# Patient Record
Sex: FEMALE | Race: OTHER | NOT HISPANIC OR LATINO | URBAN - METROPOLITAN AREA
[De-identification: names, ages, dates, MRNs, and addresses within clinical notes are randomized per-mention and may not be internally consistent; named-entity substitution may affect disease eponyms.]

---

## 2024-04-01 ENCOUNTER — INPATIENT (INPATIENT)
Facility: HOSPITAL | Age: 54
LOS: 1 days | Discharge: HOME | DRG: 272 | End: 2024-04-03
Attending: HOSPITALIST | Admitting: INTERNAL MEDICINE
Payer: COMMERCIAL

## 2024-04-01 ENCOUNTER — RESULT REVIEW (OUTPATIENT)
Age: 54
End: 2024-04-01

## 2024-04-01 VITALS
TEMPERATURE: 98 F | HEIGHT: 63 IN | WEIGHT: 130.29 LBS | OXYGEN SATURATION: 99 % | HEART RATE: 104 BPM | RESPIRATION RATE: 24 BRPM

## 2024-04-01 DIAGNOSIS — I21.3 ST ELEVATION (STEMI) MYOCARDIAL INFARCTION OF UNSPECIFIED SITE: ICD-10-CM

## 2024-04-01 LAB
A1C WITH ESTIMATED AVERAGE GLUCOSE RESULT: 6.6 % — HIGH (ref 4–5.6)
ALBUMIN SERPL ELPH-MCNC: 4.6 G/DL — SIGNIFICANT CHANGE UP (ref 3.3–5)
ALP SERPL-CCNC: 90 U/L — SIGNIFICANT CHANGE UP (ref 40–120)
ALT FLD-CCNC: 24 U/L — SIGNIFICANT CHANGE UP (ref 10–45)
ANION GAP SERPL CALC-SCNC: 15 MMOL/L — SIGNIFICANT CHANGE UP (ref 5–17)
APTT BLD: 35.1 SEC — SIGNIFICANT CHANGE UP (ref 24.5–35.6)
APTT BLD: 55.1 SEC — HIGH (ref 24.5–35.6)
APTT BLD: >200 SEC — CRITICAL HIGH (ref 24.5–35.6)
AST SERPL-CCNC: 37 U/L — SIGNIFICANT CHANGE UP (ref 10–40)
BILIRUB SERPL-MCNC: 0.5 MG/DL — SIGNIFICANT CHANGE UP (ref 0.2–1.2)
BLD GP AB SCN SERPL QL: NEGATIVE — SIGNIFICANT CHANGE UP
BUN SERPL-MCNC: 14 MG/DL — SIGNIFICANT CHANGE UP (ref 7–23)
CALCIUM SERPL-MCNC: 9 MG/DL — SIGNIFICANT CHANGE UP (ref 8.4–10.5)
CHLORIDE SERPL-SCNC: 103 MMOL/L — SIGNIFICANT CHANGE UP (ref 96–108)
CHOLEST SERPL-MCNC: 245 MG/DL — HIGH
CK MB BLD-MCNC: 7.7 % — HIGH (ref 0–3.5)
CK MB CFR SERPL CALC: 26.9 NG/ML — HIGH (ref 0–3.8)
CK SERPL-CCNC: 351 U/L — HIGH (ref 25–170)
CO2 SERPL-SCNC: 21 MMOL/L — LOW (ref 22–31)
CREAT SERPL-MCNC: 0.63 MG/DL — SIGNIFICANT CHANGE UP (ref 0.5–1.3)
EGFR: 107 ML/MIN/1.73M2 — SIGNIFICANT CHANGE UP
ESTIMATED AVERAGE GLUCOSE: 143 MG/DL — HIGH (ref 68–114)
GLUCOSE BLDC GLUCOMTR-MCNC: 124 MG/DL — HIGH (ref 70–99)
GLUCOSE BLDC GLUCOMTR-MCNC: 131 MG/DL — HIGH (ref 70–99)
GLUCOSE BLDC GLUCOMTR-MCNC: 141 MG/DL — HIGH (ref 70–99)
GLUCOSE SERPL-MCNC: 178 MG/DL — HIGH (ref 70–99)
HCG SERPL-ACNC: <2 MIU/ML — SIGNIFICANT CHANGE UP
HCT VFR BLD CALC: 38.5 % — SIGNIFICANT CHANGE UP (ref 34.5–45)
HDLC SERPL-MCNC: 68 MG/DL — SIGNIFICANT CHANGE UP
HGB BLD-MCNC: 12.8 G/DL — SIGNIFICANT CHANGE UP (ref 11.5–15.5)
INR BLD: 1.09 RATIO — SIGNIFICANT CHANGE UP (ref 0.85–1.18)
LIPID PNL WITH DIRECT LDL SERPL: 171 MG/DL — HIGH
MAGNESIUM SERPL-MCNC: 2 MG/DL — SIGNIFICANT CHANGE UP (ref 1.6–2.6)
MCHC RBC-ENTMCNC: 29.2 PG — SIGNIFICANT CHANGE UP (ref 27–34)
MCHC RBC-ENTMCNC: 33.2 GM/DL — SIGNIFICANT CHANGE UP (ref 32–36)
MCV RBC AUTO: 87.7 FL — SIGNIFICANT CHANGE UP (ref 80–100)
NON HDL CHOLESTEROL: 176 MG/DL — HIGH
NRBC # BLD: 0 /100 WBCS — SIGNIFICANT CHANGE UP (ref 0–0)
PHOSPHATE SERPL-MCNC: 2.6 MG/DL — SIGNIFICANT CHANGE UP (ref 2.5–4.5)
PLATELET # BLD AUTO: 260 K/UL — SIGNIFICANT CHANGE UP (ref 150–400)
POTASSIUM SERPL-MCNC: 4.2 MMOL/L — SIGNIFICANT CHANGE UP (ref 3.5–5.3)
POTASSIUM SERPL-SCNC: 4.2 MMOL/L — SIGNIFICANT CHANGE UP (ref 3.5–5.3)
PROT SERPL-MCNC: 8 G/DL — SIGNIFICANT CHANGE UP (ref 6–8.3)
PROTHROM AB SERPL-ACNC: 12 SEC — SIGNIFICANT CHANGE UP (ref 9.5–13)
RBC # BLD: 4.39 M/UL — SIGNIFICANT CHANGE UP (ref 3.8–5.2)
RBC # FLD: 12.3 % — SIGNIFICANT CHANGE UP (ref 10.3–14.5)
RH IG SCN BLD-IMP: POSITIVE — SIGNIFICANT CHANGE UP
SODIUM SERPL-SCNC: 139 MMOL/L — SIGNIFICANT CHANGE UP (ref 135–145)
TRIGL SERPL-MCNC: 40 MG/DL — SIGNIFICANT CHANGE UP
TROPONIN T, HIGH SENSITIVITY RESULT: 405 NG/L — HIGH (ref 0–51)
TSH SERPL-MCNC: 1.74 UIU/ML — SIGNIFICANT CHANGE UP (ref 0.27–4.2)
WBC # BLD: 11.73 K/UL — HIGH (ref 3.8–10.5)
WBC # FLD AUTO: 11.73 K/UL — HIGH (ref 3.8–10.5)

## 2024-04-01 PROCEDURE — 71045 X-RAY EXAM CHEST 1 VIEW: CPT | Mod: 26

## 2024-04-01 PROCEDURE — 33967 INSERT I-AORT PERCUT DEVICE: CPT

## 2024-04-01 PROCEDURE — 92941 PRQ TRLML REVSC TOT OCCL AMI: CPT | Mod: RC

## 2024-04-01 PROCEDURE — 93010 ELECTROCARDIOGRAM REPORT: CPT

## 2024-04-01 PROCEDURE — 93356 MYOCRD STRAIN IMG SPCKL TRCK: CPT

## 2024-04-01 PROCEDURE — 99292 CRITICAL CARE ADDL 30 MIN: CPT

## 2024-04-01 PROCEDURE — 93306 TTE W/DOPPLER COMPLETE: CPT | Mod: 26

## 2024-04-01 PROCEDURE — 93458 L HRT ARTERY/VENTRICLE ANGIO: CPT | Mod: 26,59

## 2024-04-01 RX ORDER — HYDRALAZINE HCL 50 MG
25 TABLET ORAL EVERY 8 HOURS
Refills: 0 | Status: DISCONTINUED | OUTPATIENT
Start: 2024-04-01 | End: 2024-04-02

## 2024-04-01 RX ORDER — HYDRALAZINE HCL 50 MG
10 TABLET ORAL EVERY 8 HOURS
Refills: 0 | Status: DISCONTINUED | OUTPATIENT
Start: 2024-04-01 | End: 2024-04-01

## 2024-04-01 RX ORDER — HEPARIN SODIUM 5000 [USP'U]/ML
700 INJECTION INTRAVENOUS; SUBCUTANEOUS
Qty: 25000 | Refills: 0 | Status: DISCONTINUED | OUTPATIENT
Start: 2024-04-01 | End: 2024-04-02

## 2024-04-01 RX ORDER — INSULIN LISPRO 100/ML
VIAL (ML) SUBCUTANEOUS AT BEDTIME
Refills: 0 | Status: DISCONTINUED | OUTPATIENT
Start: 2024-04-01 | End: 2024-04-03

## 2024-04-01 RX ORDER — INFLUENZA VIRUS VACCINE 15; 15; 15; 15 UG/.5ML; UG/.5ML; UG/.5ML; UG/.5ML
0.5 SUSPENSION INTRAMUSCULAR ONCE
Refills: 0 | Status: DISCONTINUED | OUTPATIENT
Start: 2024-04-01 | End: 2024-04-03

## 2024-04-01 RX ORDER — INSULIN LISPRO 100/ML
VIAL (ML) SUBCUTANEOUS
Refills: 0 | Status: DISCONTINUED | OUTPATIENT
Start: 2024-04-01 | End: 2024-04-03

## 2024-04-01 RX ORDER — FUROSEMIDE 40 MG
20 TABLET ORAL ONCE
Refills: 0 | Status: COMPLETED | OUTPATIENT
Start: 2024-04-01 | End: 2024-04-01

## 2024-04-01 RX ORDER — ATORVASTATIN CALCIUM 80 MG/1
80 TABLET, FILM COATED ORAL AT BEDTIME
Refills: 0 | Status: DISCONTINUED | OUTPATIENT
Start: 2024-04-01 | End: 2024-04-03

## 2024-04-01 RX ORDER — CLOPIDOGREL BISULFATE 75 MG/1
75 TABLET, FILM COATED ORAL DAILY
Refills: 0 | Status: DISCONTINUED | OUTPATIENT
Start: 2024-04-02 | End: 2024-04-03

## 2024-04-01 RX ORDER — ASPIRIN/CALCIUM CARB/MAGNESIUM 324 MG
81 TABLET ORAL DAILY
Refills: 0 | Status: DISCONTINUED | OUTPATIENT
Start: 2024-04-02 | End: 2024-04-03

## 2024-04-01 RX ORDER — METOPROLOL TARTRATE 50 MG
12.5 TABLET ORAL
Refills: 0 | Status: DISCONTINUED | OUTPATIENT
Start: 2024-04-02 | End: 2024-04-02

## 2024-04-01 RX ORDER — ACETAMINOPHEN 500 MG
1000 TABLET ORAL ONCE
Refills: 0 | Status: COMPLETED | OUTPATIENT
Start: 2024-04-01 | End: 2024-04-01

## 2024-04-01 RX ORDER — METOPROLOL TARTRATE 50 MG
12.5 TABLET ORAL ONCE
Refills: 0 | Status: COMPLETED | OUTPATIENT
Start: 2024-04-01 | End: 2024-04-01

## 2024-04-01 RX ORDER — HYDRALAZINE HCL 50 MG
25 TABLET ORAL ONCE
Refills: 0 | Status: COMPLETED | OUTPATIENT
Start: 2024-04-01 | End: 2024-04-01

## 2024-04-01 RX ADMIN — HEPARIN SODIUM 8 UNIT(S)/HR: 5000 INJECTION INTRAVENOUS; SUBCUTANEOUS at 23:45

## 2024-04-01 RX ADMIN — Medication 25 MILLIGRAM(S): at 18:37

## 2024-04-01 RX ADMIN — Medication 400 MILLIGRAM(S): at 12:10

## 2024-04-01 RX ADMIN — Medication 10 MILLIGRAM(S): at 16:45

## 2024-04-01 RX ADMIN — ATORVASTATIN CALCIUM 80 MILLIGRAM(S): 80 TABLET, FILM COATED ORAL at 22:17

## 2024-04-01 RX ADMIN — Medication 12.5 MILLIGRAM(S): at 22:46

## 2024-04-01 RX ADMIN — Medication 1000 MILLIGRAM(S): at 12:40

## 2024-04-01 RX ADMIN — Medication 25 MILLIGRAM(S): at 22:46

## 2024-04-01 RX ADMIN — HEPARIN SODIUM 7 UNIT(S)/HR: 5000 INJECTION INTRAVENOUS; SUBCUTANEOUS at 16:58

## 2024-04-01 NOTE — H&P ADULT - NSHPLABSRESULTS_GEN_ALL_CORE
12.8   11.73 )-----------( 260      ( 01 Apr 2024 10:21 )             38.5       04-01    139  |  103  |  14  ----------------------------<  178<H>  4.2   |  21<L>  |  0.63    Ca    9.0      01 Apr 2024 10:21  Phos  2.6     04-01  Mg     2.0     04-01    TPro  8.0  /  Alb  4.6  /  TBili  0.5  /  DBili  x   /  AST  37  /  ALT  24  /  AlkPhos  90  04-01      CARDIAC MARKERS ( 01 Apr 2024 10:21 )  x     / x     / 351 U/L / x     / 26.9 ng/mL

## 2024-04-01 NOTE — H&P ADULT - NSHPREVIEWOFSYSTEMS_GEN_ALL_CORE
ROS as noted in HPI, all else negative.     REVIEW OF SYSTEMS:    CONSTITUTIONAL:  No weakness, fevers or chills  EYES/ENT:  No visual changes;  No vertigo or throat pain   NECK:  No pain or stiffness  RESPIRATORY:  No cough, wheezing, hemoptysis; No shortness of breath  CARDIOVASCULAR:  No chest pain or palpitations  GASTROINTESTINAL: Intermittent nausea,  no abdominal or epigastric pain. No vomiting, or hematemesis; No diarrhea or constipation. No melena or hematochezia.  GENITOURINARY:  No dysuria, frequency or hematuria  MUSCULOSKELETAL:  Denies muscle pain, weakness  NEUROLOGICAL:  No numbness or weakness  SKIN:  No itching, rashes

## 2024-04-01 NOTE — H&P ADULT - ASSESSMENT
Patient is 54 y/o female with pmhx of DM2 (on insulin and metformin). presented to OSH with worsening left sided chest pain with radiation to left arm/jaw. was found to have stemi, transferred to [facility] for emergent cath. 100% occlusion of RCA with 1 SATISH, IABP placement for perfusion. plans for staged Lcx. CICU for further management.     NEURO:  - A&O x 3, no focal deficits   - Continue to monitor mental status per ICU protocol     CV:  STEMI   - Pt with + troponin, stemi at OSH.   - Loaded with Asa, plavix   - Underwent LHC, found to have 100% occlusion of RCA as culprit vessel; s/p 1 SATISH   - IABP placed for perfusion   - Plan for staged LCX tomorrow   - Trend troponin to peak   -  Continue with ASA/Plavix for DAPT   - High intensity statin, Lipitor 80mg   - TTE to asses EF and wall motion     PULM:  - Intermittently requiring 2L nc   - Satting well 98-99%   - Continue to monitor resp status, maintain >94%     RENAL:  - BUN/Cr 14/0.63, wnl, appears euvolemic on exam   - Lytes unremarkable   - trend and replete lytes prn   - Keep Mg>2, K>4    GI:  - regular diet as tolerated, no limitations   - Nausea during cath, pt received Zofran, now resolved   - No active issues, ALT/AST wnl    - Monitor for BM     ENDO:  #DM2: HbA1c 6.6 on admission   - Insulin and metformin at home  - ISS  - Monitor glucose on bmp, FS  - f/u lipid panel     HEMATOLOGIC:  - H/H stable       #DVT prophylaxis   -    ID:  #Pt without strong objective or clinical evidence of infection. Will observe off antibiotics    SKIN:  #Lines:  2x PIV       Patient is 52 y/o female with pmhx of DM2 (on insulin and metformin). presented to OSH with worsening left sided chest pain with radiation to left arm/jaw. was found to have stemi, transferred to [facility] for emergent cath. 100% occlusion of RCA with 1 SATISH, IABP placement for perfusion. plans for staged Lcx. CICU for further management.     NEURO:  - A&O x 3, no focal deficits   - Continue to monitor mental status per ICU protocol     CV:  STEMI   - Pt with + troponin, stemi at OSH.   - Loaded with Asa, plavix   - Underwent LHC, found to have 100% occlusion of RCA as culprit vessel; s/p 1 SATISH   - IABP placed for perfusion   - Plan for staged LCX tomorrow   - Trend troponin to peak   -  Continue with ASA/Plavix for DAPT   - High intensity statin, Lipitor 80mg   - TTE to asses EF and wall motion     PULM:  - Intermittently requiring 2L nc   - Satting well 98-99%   - Continue to monitor resp status, maintain >94%     RENAL:  - BUN/Cr 14/0.63, wnl, appears euvolemic on exam   - Lytes unremarkable   - trend and replete lytes prn   - Keep Mg>2, K>4    GI:  - regular diet as tolerated, no limitations   - Nausea during cath, pt received Zofran, now resolved   - No active issues, ALT/AST wnl    - Monitor for BM     ENDO:  #DM2: HbA1c 6.6 on admission   - Insulin and metformin at home  - ISS  - Monitor glucose on bmp, FS  - f/u lipid panel     HEMATOLOGIC:  - H/H stable 12/38   - no active issues   DVT ppx Heparin drip once PTT normalizes  Heparin while IABP in place     ID:  - Leukocytosis 11, likely reactive in setting of stemi   - No evidence to prove infectious etiology at this time, pt remains afebroiile with no active complaints   - Continue to trend white count    Lines   R IABP

## 2024-04-01 NOTE — PATIENT PROFILE ADULT - FALL HARM RISK - UNIVERSAL INTERVENTIONS
Bed in lowest position, wheels locked, appropriate side rails in place/Call bell, personal items and telephone in reach/Instruct patient to call for assistance before getting out of bed or chair/Non-slip footwear when patient is out of bed/[place] to call system/Physically safe environment - no spills, clutter or unnecessary equipment/Purposeful Proactive Rounding/Room/bathroom lighting operational, light cord in reach

## 2024-04-01 NOTE — H&P ADULT - NSHPADDITIONALINFOADULT_GEN_ALL_CORE
52yo woman who presented with acute RCA STEMI s/p PCI with good result, requiring IABP for HD instability during procedure. Also residual LCx disease    Neuro no issues  CV: RCA stemi with multivessel disease, await staged intervetion to LCx 4/2. Cont ASA, Plavix, statin and start BB/ ACEi prior to DC. IABP at 1:1  Pulm on RA  GI DASH diet  Renal Cr wnl, LVEDP of 21  ID no infection  Heme heparin for IABP  Endo BG controlled  Lines IABP (4/1)

## 2024-04-01 NOTE — H&P ADULT - HISTORY OF PRESENT ILLNESS
52 y/o female with pmhx of DM2 on Insulin and Metformin. She reports initially feeling chest pressures x 3 days ago which she attributed to being stressed, was then relieved with rest and water. She then began having severe left sided chest pain this morning radiating to her left arm and jaw, associated with nausea and vomiting. She initially tried to rest but felt she was having a "heart attack" to which she had her nephew call 911. pt then was taken to [facility], found to be having a stemi, was transferred to [facility] for emergent cath. She was aspirin and plavix loaded. Pt was then found to have complete occlusion of RCA as culprit vessel, received 1 SATISH, had IABP for perfusion. Was then transferred to CICU for further management with possible staged lcx.

## 2024-04-01 NOTE — PROGRESS NOTE ADULT - SUBJECTIVE AND OBJECTIVE BOX
PATIENT:  LOUIS DESAI  [id number]    CHIEF COMPLAINT:  Patient is a 53y old  Female who presents with a chief complaint of     INTERVAL HISTORYOVERNIGHT EVENTS:      REVIEW OF SYSTEMS:    Constitutional:     [ ] negative [ ] fevers [ ] chills [ ] weight loss [ ] weight gain  HEENT:                  [ ] negative [ ] dry eyes [ ] eye irritation [ ] postnasal drip [ ] nasal congestion  CV:                         [ ] negative  [ ] chest pain [ ] orthopnea [ ] palpitations [ ] murmur  Resp:                     [ ] negative [ ] cough [ ] shortness of breath [ ] dyspnea [ ] wheezing [ ] sputum [ ] hemoptysis  GI:                          [ ] negative [ ] nausea [ ] vomiting [ ] diarrhea [ ] constipation [ ] abd pain [ ] dysphagia   :                        [ ] negative [ ] dysuria [ ] nocturia [ ] hematuria [ ] increased urinary frequency  Musculoskeletal: [ ] negative [ ] back pain [ ] myalgias [ ] arthralgias [ ] fracture  Skin:                       [ ] negative [ ] rash [ ] itch  Neurological:        [ ] negative [ ] headache [ ] dizziness [ ] syncope [ ] weakness [ ] numbness  Psychiatric:           [ ] negative [ ] anxiety [ ] depression  Endocrine:            [ ] negative [ ] diabetes [ ] thyroid problem  Heme/Lymph:      [ ] negative [ ] anemia [ ] bleeding problem  Allergic/Immune: [ ] negative [ ] itchy eyes [ ] nasal discharge [ ] hives [ ] angioedema    [ ] All other systems negative  [ ] Unable to assess ROS because ________.    MEDICATIONS:  MEDICATIONS  (STANDING):  atorvastatin 80 milliGRAM(s) Oral at bedtime  heparin  Infusion 700 Unit(s)/Hr (7 mL/Hr) IV Continuous <Continuous>  hydrALAZINE 25 milliGRAM(s) Oral every 8 hours  influenza   Vaccine 0.5 milliLiter(s) IntraMuscular once  insulin lispro (ADMELOG) corrective regimen sliding scale   SubCutaneous three times a day before meals  insulin lispro (ADMELOG) corrective regimen sliding scale   SubCutaneous at bedtime    MEDICATIONS  (PRN):      ALLERGIES:  Allergies    No Known Allergies    Intolerances        OBJECTIVE:  ICU Vital Signs Last 24 Hrs  T(C): 37.8 (01 Apr 2024 18:00), Max: 37.8 (01 Apr 2024 18:00)  T(F): 100 (01 Apr 2024 18:00), Max: 100 (01 Apr 2024 18:00)  HR: 66 (01 Apr 2024 19:00) (61 - 104)  BP: 125/82 (01 Apr 2024 10:00) (125/82 - 125/82)  BP(mean): 100 (01 Apr 2024 10:00) (100 - 100)  ABP: --  ABP(mean): --  RR: 18 (01 Apr 2024 19:00) (11 - 24)  SpO2: 97% (01 Apr 2024 19:00) (97% - 100%)    O2 Parameters below as of 01 Apr 2024 19:00  Patient On (Oxygen Delivery Method): room air            Adult Advanced Hemodynamics Last 24 Hrs  CVP(mm Hg): --  CVP(cm H2O): --  CO: --  CI: --  PA: --  PA(mean): --  PCWP: --  SVR: --  SVRI: --  PVR: --  PVRI: --  CAPILLARY BLOOD GLUCOSE      POCT Blood Glucose.: 131 mg/dL (01 Apr 2024 16:37)  POCT Blood Glucose.: 141 mg/dL (01 Apr 2024 12:21)    CAPILLARY BLOOD GLUCOSE      POCT Blood Glucose.: 131 mg/dL (01 Apr 2024 16:37)    I&O's Summary    01 Apr 2024 07:01  -  01 Apr 2024 19:51  --------------------------------------------------------  IN: 474 mL / OUT: 600 mL / NET: -126 mL      Daily Height in cm: 160.02 (01 Apr 2024 09:50)    Daily     PHYSICAL EXAMINATION:  General: WN/WD NAD  HEENT: PERRLA, EOMI, moist mucous membranes  Neurology: A&Ox3, nonfocal, HADDAD x 4  Respiratory: CTA B/L, normal respiratory effort, no wheezes, crackles, rales  CV: RRR, S1S2, no murmurs, rubs or gallops  Abdominal: Soft, NT, ND +BS, Last BM  Extremities: No edema, + peripheral pulses  Incisions:   Tubes:    LABS:                          12.8   11.73 )-----------( 260      ( 01 Apr 2024 10:21 )             38.5     04-01    139  |  103  |  14  ----------------------------<  178<H>  4.2   |  21<L>  |  0.63    Ca    9.0      01 Apr 2024 10:21  Phos  2.6     04-01  Mg     2.0     04-01    TPro  8.0  /  Alb  4.6  /  TBili  0.5  /  DBili  x   /  AST  37  /  ALT  24  /  AlkPhos  90  04-01    LIVER FUNCTIONS - ( 01 Apr 2024 10:21 )  Alb: 4.6 g/dL / Pro: 8.0 g/dL / ALK PHOS: 90 U/L / ALT: 24 U/L / AST: 37 U/L / GGT: x           PT/INR - ( 01 Apr 2024 10:21 )   PT: 12.0 sec;   INR: 1.09 ratio         PTT - ( 01 Apr 2024 15:54 )  PTT:35.1 sec  Creatine Kinase, Serum: 351 U/L (04-01 @ 10:21)  CKMB Units: 26.9 ng/mL (04-01 @ 10:21)    CARDIAC MARKERS ( 01 Apr 2024 10:21 )  x     / x     / 351 U/L / x     / 26.9 ng/mL      Urinalysis Basic - ( 01 Apr 2024 10:21 )    Color: x / Appearance: x / SG: x / pH: x  Gluc: 178 mg/dL / Ketone: x  / Bili: x / Urobili: x   Blood: x / Protein: x / Nitrite: x   Leuk Esterase: x / RBC: x / WBC x   Sq Epi: x / Non Sq Epi: x / Bacteria: x        TELEMETRY:     EKG:     IMAGING:       PATIENT:  LOUIS DESAI  [id number]    CHIEF COMPLAINT:  Patient is a 53y old  Female who presents with a chief complaint of     INTERVAL HISTORY: s/p SATISH x1 RCA + IABP      REVIEW OF SYSTEMS:  Constitutional:     [x ] negative [ ] fevers [ ] chills [ ] weight loss [ ] weight gain  HEENT:                  [x ] negative [ ] dry eyes [ ] eye irritation [ ] postnasal drip [ ] nasal congestion  CV:                         [x ] negative  [ ] chest pain [ ] orthopnea [ ] palpitations [ ] murmur  Resp:                     [x ] negative [ ] cough [ ] shortness of breath [ ] dyspnea [ ] wheezing [ ] sputum [ ] hemoptysis  GI:                          [ x] negative [ ] nausea [ ] vomiting [ ] diarrhea [ ] constipation [ ] abd pain [ ] dysphagia   :                        [x ] negative [ ] dysuria [ ] nocturia [ ] hematuria [ ] increased urinary frequency  Musculoskeletal: [x ] negative [ ] back pain [ ] myalgias [ ] arthralgias [ ] fracture  Skin:                       [x ] negative [ ] rash [ ] itch  Neurological:        [x ] negative [ ] headache [ ] dizziness [ ] syncope [ ] weakness [ ] numbness    MEDICATIONS:  MEDICATIONS  (STANDING):  atorvastatin 80 milliGRAM(s) Oral at bedtime  heparin  Infusion 700 Unit(s)/Hr (7 mL/Hr) IV Continuous <Continuous>  hydrALAZINE 25 milliGRAM(s) Oral every 8 hours  influenza   Vaccine 0.5 milliLiter(s) IntraMuscular once  insulin lispro (ADMELOG) corrective regimen sliding scale   SubCutaneous three times a day before meals  insulin lispro (ADMELOG) corrective regimen sliding scale   SubCutaneous at bedtime    MEDICATIONS  (PRN):    ALLERGIES:  Allergies    No Known Allergies    Intolerances    OBJECTIVE:  ICU Vital Signs Last 24 Hrs  T(C): 37.8 (01 Apr 2024 18:00), Max: 37.8 (01 Apr 2024 18:00)  T(F): 100 (01 Apr 2024 18:00), Max: 100 (01 Apr 2024 18:00)  HR: 66 (01 Apr 2024 19:00) (61 - 104)  BP: 125/82 (01 Apr 2024 10:00) (125/82 - 125/82)  BP(mean): 100 (01 Apr 2024 10:00) (100 - 100)  ABP: --  ABP(mean): --  RR: 18 (01 Apr 2024 19:00) (11 - 24)  SpO2: 97% (01 Apr 2024 19:00) (97% - 100%)    O2 Parameters below as of 01 Apr 2024 19:00  Patient On (Oxygen Delivery Method): room air      Adult Advanced Hemodynamics Last 24 Hrs  CVP(mm Hg): --  CVP(cm H2O): --  CO: --  CI: --  PA: --  PA(mean): --  PCWP: --  SVR: --  SVRI: --  PVR: --  PVRI: --  CAPILLARY BLOOD GLUCOSE      POCT Blood Glucose.: 131 mg/dL (01 Apr 2024 16:37)  POCT Blood Glucose.: 141 mg/dL (01 Apr 2024 12:21)    CAPILLARY BLOOD GLUCOSE      POCT Blood Glucose.: 131 mg/dL (01 Apr 2024 16:37)    I&O's Summary    01 Apr 2024 07:01  -  01 Apr 2024 19:51  --------------------------------------------------------  IN: 474 mL / OUT: 600 mL / NET: -126 mL      Daily Height in cm: 160.02 (01 Apr 2024 09:50)    Daily     PHYSICAL EXAMINATION:  General: WN/WD NAD  HEENT: PERRLA, EOMI, moist mucous membranes  Neurology: A&Ox3, nonfocal, HADDAD x 4  Respiratory: CTA B/L, normal respiratory effort, no wheezes, crackles, rales  CV: RRR, S1S2, no murmurs, rubs or gallops  Abdominal: Soft, NT, ND +BS  Extremities: No edema, + peripheral pulses  Skin: warm, dry  Lines: R fem IABP c/d/i    LABS:                      12.8   11.73 )-----------( 260      ( 01 Apr 2024 10:21 )             38.5     04-01    139  |  103  |  14  ----------------------------<  178<H>  4.2   |  21<L>  |  0.63    Ca    9.0      01 Apr 2024 10:21  Phos  2.6     04-01  Mg     2.0     04-01    TPro  8.0  /  Alb  4.6  /  TBili  0.5  /  DBili  x   /  AST  37  /  ALT  24  /  AlkPhos  90  04-01    LIVER FUNCTIONS - ( 01 Apr 2024 10:21 )  Alb: 4.6 g/dL / Pro: 8.0 g/dL / ALK PHOS: 90 U/L / ALT: 24 U/L / AST: 37 U/L / GGT: x           PT/INR - ( 01 Apr 2024 10:21 )   PT: 12.0 sec;   INR: 1.09 ratio         PTT - ( 01 Apr 2024 15:54 )  PTT:35.1 sec  Creatine Kinase, Serum: 351 U/L (04-01 @ 10:21)  CKMB Units: 26.9 ng/mL (04-01 @ 10:21)    CARDIAC MARKERS ( 01 Apr 2024 10:21 )  x     / x     / 351 U/L / x     / 26.9 ng/mL    Urinalysis Basic - ( 01 Apr 2024 10:21 )    Color: x / Appearance: x / SG: x / pH: x  Gluc: 178 mg/dL / Ketone: x  / Bili: x / Urobili: x   Blood: x / Protein: x / Nitrite: x   Leuk Esterase: x / RBC: x / WBC x   Sq Epi: x / Non Sq Epi: x / Bacteria: x      TELEMETRY: reviewed    EKG: reviewed    IMAGING: reviewed

## 2024-04-01 NOTE — PROGRESS NOTE ADULT - ASSESSMENT
Assessment	  52 y/o female with pmhx of DM2 (on insulin and metformin) presented to OSH with worsening left sided chest pain with radiation to left arm/jaw, found to have IWSTEMI, transferred to [facility] for emergent cath. 100% occlusion of RCA with 1 SATISH, IABP placement for perfusion, plan for staged Lcx. CICU for further management.     NEURO:  - A&O x 3, no focal deficits   - Continue to monitor mental status per ICU protocol     PULM:  - saturating well on room air  - Continue to monitor resp status, maintain >94%     CV:  STEMI   - 4/1 % occlusion of RCA as culprit vessel; s/p 1 SATISH   - TTE 4/1: EF 62%  - IABP placed for perfusion, continue 1:1 w/ heparin gtt  - euvolemic on exam  - DAPT: asa/plavix  - high intensity statin  - hydralazine 25 TID for afterload reduction  - start BB  - Plan for staged LCX tomorrow   - continue strict I&O, daily weights, perfusion indices monitoring    RENAL:  - sCr within normal limits  - continue strict I&O, electrolyte monitoring  - trend daily, Keep Mg>2, K>4    GI:  - continue dash/diabetic diet  - monitor for BM    ENDO:  #DM2: HbA1c 6.6 on admission   - Insulin and metformin at home  - ISS while inpatient, FS ac/hs      - lipid panel noted, continue lipitor  - TSH within normal limits    HEMATOLOGIC:  - H/H and platelets stable   - trend daily  #DVT ppx: heparin gtt w/ IABP    ID:  # Leukocytosis   - likely reactive in setting of stemi   - no s/sx of infection  - Continue to trend WBC and fever curve    #full code  #Lines:   R IABP 4/1 -     ======================= DISPOSITION  =====================  [X] Critical   [ ] Guarded    [ ] Stable    [X] Maintain in CICU  [ ] Downgrade to [place]  [ ] Discharge Home    Patient requires continuous monitoring with bedside rhythm monitoring, pulse ox monitoring, and intermittent blood gas analysis. Care plan discussed with ICU care team. Patient remained critical and at risk for life threatening decompensation.  Patient seen, examined and plan discussed with CCU team during rounds.     I have personally provided 30 minutes of critical care time excluding time spent on separate procedures, in addition to initial critical care time provided by the CICU Attending, Dr. Church.    Maddie Camarillo, [facility]-BC

## 2024-04-01 NOTE — H&P ADULT - NSHPPHYSICALEXAM_GEN_ALL_CORE
PHYSICAL EXAM  Appearance: Normal, NAD  HEAD:  Atraumatic, Normocephalic  EYES: EOMI, PERRLA, conjunctiva and sclera clear  NECK: Supple, No JVD  CHEST/LUNG: Clear to auscultation bilaterally; No wheezing  HEART: Normal S1, S2. No murmurs, rubs, or gallops  ABDOMEN: + Bowel sounds, Soft, NT, ND   EXTREMITIES:  2+ Peripheral Pulses, No clubbing, cyanosis, or edema,  NEUROLOGY: non-focal, aaox3  Lymphatic: No lymphadenopathy  SKIN: No rashes or lesions  R femoral IABP site soft/ nontender

## 2024-04-02 LAB
ALBUMIN SERPL ELPH-MCNC: 4 G/DL — SIGNIFICANT CHANGE UP (ref 3.3–5)
ALP SERPL-CCNC: 79 U/L — SIGNIFICANT CHANGE UP (ref 40–120)
ALT FLD-CCNC: 31 U/L — SIGNIFICANT CHANGE UP (ref 10–45)
ANION GAP SERPL CALC-SCNC: 13 MMOL/L — SIGNIFICANT CHANGE UP (ref 5–17)
APTT BLD: 64.4 SEC — HIGH (ref 24.5–35.6)
APTT BLD: 76.6 SEC — HIGH (ref 24.5–35.6)
AST SERPL-CCNC: 87 U/L — HIGH (ref 10–40)
BASOPHILS # BLD AUTO: 0.03 K/UL — SIGNIFICANT CHANGE UP (ref 0–0.2)
BASOPHILS # BLD AUTO: 0.04 K/UL — SIGNIFICANT CHANGE UP (ref 0–0.2)
BASOPHILS NFR BLD AUTO: 0.3 % — SIGNIFICANT CHANGE UP (ref 0–2)
BASOPHILS NFR BLD AUTO: 0.5 % — SIGNIFICANT CHANGE UP (ref 0–2)
BILIRUB SERPL-MCNC: 0.4 MG/DL — SIGNIFICANT CHANGE UP (ref 0.2–1.2)
BUN SERPL-MCNC: 14 MG/DL — SIGNIFICANT CHANGE UP (ref 7–23)
CALCIUM SERPL-MCNC: 9 MG/DL — SIGNIFICANT CHANGE UP (ref 8.4–10.5)
CHLORIDE SERPL-SCNC: 104 MMOL/L — SIGNIFICANT CHANGE UP (ref 96–108)
CO2 SERPL-SCNC: 23 MMOL/L — SIGNIFICANT CHANGE UP (ref 22–31)
CREAT SERPL-MCNC: 0.81 MG/DL — SIGNIFICANT CHANGE UP (ref 0.5–1.3)
EGFR: 87 ML/MIN/1.73M2 — SIGNIFICANT CHANGE UP
EOSINOPHIL # BLD AUTO: 0.12 K/UL — SIGNIFICANT CHANGE UP (ref 0–0.5)
EOSINOPHIL # BLD AUTO: 0.13 K/UL — SIGNIFICANT CHANGE UP (ref 0–0.5)
EOSINOPHIL NFR BLD AUTO: 1.3 % — SIGNIFICANT CHANGE UP (ref 0–6)
EOSINOPHIL NFR BLD AUTO: 1.6 % — SIGNIFICANT CHANGE UP (ref 0–6)
GLUCOSE BLDC GLUCOMTR-MCNC: 140 MG/DL — HIGH (ref 70–99)
GLUCOSE BLDC GLUCOMTR-MCNC: 144 MG/DL — HIGH (ref 70–99)
GLUCOSE BLDC GLUCOMTR-MCNC: 161 MG/DL — HIGH (ref 70–99)
GLUCOSE BLDC GLUCOMTR-MCNC: 178 MG/DL — HIGH (ref 70–99)
GLUCOSE SERPL-MCNC: 155 MG/DL — HIGH (ref 70–99)
HCT VFR BLD CALC: 34 % — LOW (ref 34.5–45)
HCT VFR BLD CALC: 34.6 % — SIGNIFICANT CHANGE UP (ref 34.5–45)
HGB BLD-MCNC: 11.2 G/DL — LOW (ref 11.5–15.5)
HGB BLD-MCNC: 11.3 G/DL — LOW (ref 11.5–15.5)
IMM GRANULOCYTES NFR BLD AUTO: 0.2 % — SIGNIFICANT CHANGE UP (ref 0–0.9)
IMM GRANULOCYTES NFR BLD AUTO: 0.4 % — SIGNIFICANT CHANGE UP (ref 0–0.9)
LYMPHOCYTES # BLD AUTO: 2.52 K/UL — SIGNIFICANT CHANGE UP (ref 1–3.3)
LYMPHOCYTES # BLD AUTO: 26.6 % — SIGNIFICANT CHANGE UP (ref 13–44)
LYMPHOCYTES # BLD AUTO: 3.07 K/UL — SIGNIFICANT CHANGE UP (ref 1–3.3)
LYMPHOCYTES # BLD AUTO: 37.1 % — SIGNIFICANT CHANGE UP (ref 13–44)
MAGNESIUM SERPL-MCNC: 2.2 MG/DL — SIGNIFICANT CHANGE UP (ref 1.6–2.6)
MCHC RBC-ENTMCNC: 28.8 PG — SIGNIFICANT CHANGE UP (ref 27–34)
MCHC RBC-ENTMCNC: 29.3 PG — SIGNIFICANT CHANGE UP (ref 27–34)
MCHC RBC-ENTMCNC: 32.4 GM/DL — SIGNIFICANT CHANGE UP (ref 32–36)
MCHC RBC-ENTMCNC: 33.2 GM/DL — SIGNIFICANT CHANGE UP (ref 32–36)
MCV RBC AUTO: 88.1 FL — SIGNIFICANT CHANGE UP (ref 80–100)
MCV RBC AUTO: 88.9 FL — SIGNIFICANT CHANGE UP (ref 80–100)
MONOCYTES # BLD AUTO: 0.58 K/UL — SIGNIFICANT CHANGE UP (ref 0–0.9)
MONOCYTES # BLD AUTO: 0.6 K/UL — SIGNIFICANT CHANGE UP (ref 0–0.9)
MONOCYTES NFR BLD AUTO: 6.3 % — SIGNIFICANT CHANGE UP (ref 2–14)
MONOCYTES NFR BLD AUTO: 7 % — SIGNIFICANT CHANGE UP (ref 2–14)
NEUTROPHILS # BLD AUTO: 4.44 K/UL — SIGNIFICANT CHANGE UP (ref 1.8–7.4)
NEUTROPHILS # BLD AUTO: 6.17 K/UL — SIGNIFICANT CHANGE UP (ref 1.8–7.4)
NEUTROPHILS NFR BLD AUTO: 53.6 % — SIGNIFICANT CHANGE UP (ref 43–77)
NEUTROPHILS NFR BLD AUTO: 65.1 % — SIGNIFICANT CHANGE UP (ref 43–77)
NRBC # BLD: 0 /100 WBCS — SIGNIFICANT CHANGE UP (ref 0–0)
NRBC # BLD: 0 /100 WBCS — SIGNIFICANT CHANGE UP (ref 0–0)
PHOSPHATE SERPL-MCNC: 3.9 MG/DL — SIGNIFICANT CHANGE UP (ref 2.5–4.5)
PLATELET # BLD AUTO: 222 K/UL — SIGNIFICANT CHANGE UP (ref 150–400)
PLATELET # BLD AUTO: 228 K/UL — SIGNIFICANT CHANGE UP (ref 150–400)
POTASSIUM SERPL-MCNC: 3.5 MMOL/L — SIGNIFICANT CHANGE UP (ref 3.5–5.3)
POTASSIUM SERPL-SCNC: 3.5 MMOL/L — SIGNIFICANT CHANGE UP (ref 3.5–5.3)
PROT SERPL-MCNC: 7 G/DL — SIGNIFICANT CHANGE UP (ref 6–8.3)
RBC # BLD: 3.86 M/UL — SIGNIFICANT CHANGE UP (ref 3.8–5.2)
RBC # BLD: 3.89 M/UL — SIGNIFICANT CHANGE UP (ref 3.8–5.2)
RBC # FLD: 12.5 % — SIGNIFICANT CHANGE UP (ref 10.3–14.5)
RBC # FLD: 12.6 % — SIGNIFICANT CHANGE UP (ref 10.3–14.5)
SODIUM SERPL-SCNC: 140 MMOL/L — SIGNIFICANT CHANGE UP (ref 135–145)
WBC # BLD: 8.28 K/UL — SIGNIFICANT CHANGE UP (ref 3.8–10.5)
WBC # BLD: 9.48 K/UL — SIGNIFICANT CHANGE UP (ref 3.8–10.5)
WBC # FLD AUTO: 8.28 K/UL — SIGNIFICANT CHANGE UP (ref 3.8–10.5)
WBC # FLD AUTO: 9.48 K/UL — SIGNIFICANT CHANGE UP (ref 3.8–10.5)

## 2024-04-02 PROCEDURE — 99152 MOD SED SAME PHYS/QHP 5/>YRS: CPT

## 2024-04-02 PROCEDURE — 99292 CRITICAL CARE ADDL 30 MIN: CPT | Mod: 25

## 2024-04-02 PROCEDURE — 33968 REMOVE AORTIC ASSIST DEVICE: CPT

## 2024-04-02 PROCEDURE — 99291 CRITICAL CARE FIRST HOUR: CPT | Mod: 25

## 2024-04-02 PROCEDURE — 71045 X-RAY EXAM CHEST 1 VIEW: CPT | Mod: 26

## 2024-04-02 PROCEDURE — 99232 SBSQ HOSP IP/OBS MODERATE 35: CPT

## 2024-04-02 PROCEDURE — 92928 PRQ TCAT PLMT NTRAC ST 1 LES: CPT | Mod: LC

## 2024-04-02 RX ORDER — HYDRALAZINE HCL 50 MG
50 TABLET ORAL EVERY 8 HOURS
Refills: 0 | Status: DISCONTINUED | OUTPATIENT
Start: 2024-04-02 | End: 2024-04-02

## 2024-04-02 RX ORDER — FENTANYL CITRATE 50 UG/ML
25 INJECTION INTRAVENOUS ONCE
Refills: 0 | Status: DISCONTINUED | OUTPATIENT
Start: 2024-04-02 | End: 2024-04-02

## 2024-04-02 RX ORDER — CARVEDILOL PHOSPHATE 80 MG/1
6.25 CAPSULE, EXTENDED RELEASE ORAL EVERY 12 HOURS
Refills: 0 | Status: DISCONTINUED | OUTPATIENT
Start: 2024-04-02 | End: 2024-04-02

## 2024-04-02 RX ORDER — LANOLIN ALCOHOL/MO/W.PET/CERES
5 CREAM (GRAM) TOPICAL ONCE
Refills: 0 | Status: COMPLETED | OUTPATIENT
Start: 2024-04-02 | End: 2024-04-02

## 2024-04-02 RX ORDER — POTASSIUM CHLORIDE 20 MEQ
40 PACKET (EA) ORAL ONCE
Refills: 0 | Status: COMPLETED | OUTPATIENT
Start: 2024-04-02 | End: 2024-04-02

## 2024-04-02 RX ORDER — ACETAMINOPHEN 500 MG
975 TABLET ORAL ONCE
Refills: 0 | Status: COMPLETED | OUTPATIENT
Start: 2024-04-02 | End: 2024-04-02

## 2024-04-02 RX ORDER — HYDRALAZINE HCL 50 MG
25 TABLET ORAL ONCE
Refills: 0 | Status: COMPLETED | OUTPATIENT
Start: 2024-04-02 | End: 2024-04-02

## 2024-04-02 RX ORDER — ACETAMINOPHEN 500 MG
650 TABLET ORAL ONCE
Refills: 0 | Status: COMPLETED | OUTPATIENT
Start: 2024-04-02 | End: 2024-04-02

## 2024-04-02 RX ORDER — CARVEDILOL PHOSPHATE 80 MG/1
12.5 CAPSULE, EXTENDED RELEASE ORAL EVERY 12 HOURS
Refills: 0 | Status: DISCONTINUED | OUTPATIENT
Start: 2024-04-02 | End: 2024-04-03

## 2024-04-02 RX ORDER — CARVEDILOL PHOSPHATE 80 MG/1
6.25 CAPSULE, EXTENDED RELEASE ORAL ONCE
Refills: 0 | Status: COMPLETED | OUTPATIENT
Start: 2024-04-02 | End: 2024-04-02

## 2024-04-02 RX ORDER — HEPARIN SODIUM 5000 [USP'U]/ML
5000 INJECTION INTRAVENOUS; SUBCUTANEOUS EVERY 8 HOURS
Refills: 0 | Status: DISCONTINUED | OUTPATIENT
Start: 2024-04-03 | End: 2024-04-03

## 2024-04-02 RX ADMIN — Medication 1: at 12:07

## 2024-04-02 RX ADMIN — CARVEDILOL PHOSPHATE 6.25 MILLIGRAM(S): 80 CAPSULE, EXTENDED RELEASE ORAL at 18:52

## 2024-04-02 RX ADMIN — Medication 12.5 MILLIGRAM(S): at 06:53

## 2024-04-02 RX ADMIN — Medication 81 MILLIGRAM(S): at 11:51

## 2024-04-02 RX ADMIN — Medication 40 MILLIEQUIVALENT(S): at 02:51

## 2024-04-02 RX ADMIN — FENTANYL CITRATE 25 MICROGRAM(S): 50 INJECTION INTRAVENOUS at 18:45

## 2024-04-02 RX ADMIN — FENTANYL CITRATE 25 MICROGRAM(S): 50 INJECTION INTRAVENOUS at 18:20

## 2024-04-02 RX ADMIN — FENTANYL CITRATE 25 MICROGRAM(S): 50 INJECTION INTRAVENOUS at 18:30

## 2024-04-02 RX ADMIN — ATORVASTATIN CALCIUM 80 MILLIGRAM(S): 80 TABLET, FILM COATED ORAL at 22:05

## 2024-04-02 RX ADMIN — Medication 650 MILLIGRAM(S): at 15:30

## 2024-04-02 RX ADMIN — CLOPIDOGREL BISULFATE 75 MILLIGRAM(S): 75 TABLET, FILM COATED ORAL at 11:51

## 2024-04-02 RX ADMIN — Medication 25 MILLIGRAM(S): at 06:04

## 2024-04-02 RX ADMIN — Medication 25 MILLIGRAM(S): at 06:41

## 2024-04-02 RX ADMIN — Medication 5 MILLIGRAM(S): at 00:37

## 2024-04-02 RX ADMIN — Medication 650 MILLIGRAM(S): at 17:00

## 2024-04-02 RX ADMIN — Medication 50 MILLIGRAM(S): at 13:33

## 2024-04-02 RX ADMIN — FENTANYL CITRATE 25 MICROGRAM(S): 50 INJECTION INTRAVENOUS at 18:05

## 2024-04-02 RX ADMIN — CARVEDILOL PHOSPHATE 6.25 MILLIGRAM(S): 80 CAPSULE, EXTENDED RELEASE ORAL at 23:44

## 2024-04-02 NOTE — PROCEDURE NOTE - ADDITIONAL PROCEDURE DETAILS
PROCEDURE NOTE  REMOVAL OF INTRA AORTIC BALLOON PUMP    The IABP (intra-aortic balloon pump) was weaned according to protocol.  Hemodynamics remained stable.  Pulses in the     right          lower extremity are palpable.  The patient was placed in the supine position.  The insertion site was identified and the sutures were removed.  The IABP was turned off and the balloon deflated.  The IABP was then removed.  Direct pressure was applied for       32        minutes.  A sandbag was applied and is  to remain in place for three hours.    Monitoring of the     right        groin and both lower extremities including neuro-vascular checks and vital signs every 15 minutes  x4, then every 30 minutes x 2, then every 1 hr x 4 was ordered.

## 2024-04-02 NOTE — PROGRESS NOTE ADULT - SUBJECTIVE AND OBJECTIVE BOX
Interventional Cardiology Post Cath Progress Note:                Subjective:   Patient feels well- no current complaints- Denies chest pain, shortness of breath. Denies pain, numbness, tingling or swelling around R groin access site        MEDICATIONS  (STANDING):  aspirin enteric coated 81 milliGRAM(s) Oral daily  atorvastatin 80 milliGRAM(s) Oral at bedtime  clopidogrel Tablet 75 milliGRAM(s) Oral daily  heparin  Infusion 700 Unit(s)/Hr (8 mL/Hr) IV Continuous <Continuous>  hydrALAZINE 50 milliGRAM(s) Oral every 8 hours  influenza   Vaccine 0.5 milliLiter(s) IntraMuscular once  insulin lispro (ADMELOG) corrective regimen sliding scale   SubCutaneous at bedtime  insulin lispro (ADMELOG) corrective regimen sliding scale   SubCutaneous three times a day before meals  metoprolol tartrate 12.5 milliGRAM(s) Oral two times a day    MEDICATIONS  (PRN):      Objective:  Vital Signs Last 24 Hrs  T(C): 36.6 (02 Apr 2024 04:00), Max: 37.8 (01 Apr 2024 18:00)  T(F): 97.8 (02 Apr 2024 04:00), Max: 100 (01 Apr 2024 18:00)  HR: 64 (02 Apr 2024 07:00) (61 - 104)  BP: 125/82 (01 Apr 2024 10:00) (125/82 - 125/82)  BP(mean): 100 (01 Apr 2024 10:00) (100 - 100)  RR: 26 (02 Apr 2024 07:00) (11 - 29)  SpO2: 96% (02 Apr 2024 07:00) (96% - 100%)    Parameters below as of 01 Apr 2024 19:00  Patient On (Oxygen Delivery Method): room air        04-01-24 @ 07:01  -  04-02-24 @ 07:00  --------------------------------------------------------  IN: 572 mL / OUT: 2050 mL / NET: -1478 mL                              11.2   8.28  )-----------( 222      ( 02 Apr 2024 06:18 )             34.6     04-02    140  |  104  |  14  ----------------------------<  155<H>  3.5   |  23  |  0.81    Ca    9.0      02 Apr 2024 00:50  Phos  3.9     04-02  Mg     2.2     04-02    TPro  7.0  /  Alb  4.0  /  TBili  0.4  /  DBili  x   /  AST  87<H>  /  ALT  31  /  AlkPhos  79  04-02    PT/INR - ( 01 Apr 2024 10:21 )   PT: 12.0 sec;   INR: 1.09 ratio         PTT - ( 02 Apr 2024 06:18 )  PTT:64.4 sec  Urinalysis Basic - ( 02 Apr 2024 00:50 )    Color: x / Appearance: x / SG: x / pH: x  Gluc: 155 mg/dL / Ketone: x  / Bili: x / Urobili: x   Blood: x / Protein: x / Nitrite: x   Leuk Esterase: x / RBC: x / WBC x   Sq Epi: x / Non Sq Epi: x / Bacteria: x  < from: Cardiac Catheterization (04.01.24 @ 08:50) >  Cath Lab Report    Diagnostic Cardiologist:       Nena Cohn DO   Interventional Cardiologist:   Nena Cohn DO   Fellow:                        Sherrell Villegas   Referring Physician:           Davide Church MD     Procedures Performed   Procedures:                1.    Arterial Access - Right Femoral   2.    Ultrasound Guided Access   3.    Diagnostic Coronary Angiography   4.    Left Heart Cath   5.    PCI: SATISH   6.    IABP     Indications:               Myocardial infarction with ST elevation  (STEMI)  ACS Less than 24 hours     Lab Visit Indication:      ACS less than or equal to 24 hours   PCI Status:                emergent     Conclusions:   1. Occluded culprit pRCA s/p PCI with SATISH   2. Severe atherosclerosis of the mLCx   3. Moderate atherosclerosis of the mLAD   4. LVEDP 21, LVEF 40%, posterior-basal hypokinesis   5. IABP placement   Recommendations:     Continue DAPT for at least 1 year and return for staged PCI of the  mLCx.  IABP was placed for ongoing CP, hypotension, BELEM.       Diagnostic Findings:     Coronary Angiography   The coronary circulation is right dominant.      LM   Left main artery: Angiography shows no disease.      LAD   Mid left anterior descending: Angiography shows moderate  atherosclerosis. There is a 40 % stenosis. Proximal left anterior  descending: Angiography shows moderate atherosclerosis.      CX   Mid circumflex: Angiography shows severe atherosclerosis. There is a  99 % stenosis.    RCA   Proximal right coronary artery: Angiography shows complete occlusion.  There is a 100 % stenosis.    TTE W or WO Ultrasound Enhancing Agent (04.01.24 @ 11:04)      CONCLUSIONS:      1. Left ventricular cavity is normal in size. Left ventricular wall thickness is normal. Left ventricular systolic function is normal with an ejection fraction of 62 % by Decker's method of disks. There are no regional wall motion abnormalities seen.   2. Normal left ventricular diastolic function, with normal filling pressure.   3. Normal right ventricular cavity size, with normal wall thickness, and normal systolic function.   4. Normal left andright atrial size.   5. No significant valvular disease.   6. No pericardial effusion seen.   7. No prior echocardiogram is available for comparison.              Physical Exam:  No apparent distress, alert and oriented times three, appropriate affect  JVD is not elevated, supple  Clear to auscultation with no wheezing, rhonchi or crackles  Regular rate and rhythm with no murmur, rub or gallop  Soft, non-tender, non-distended, no masses/guarding or rebound tenderness  R groin: Soft, non tender, no bleeding or hematoma, clean/dry/intact- RLE/LLE +2 palpable DP pulses, IABP in place  No clubbing, cyanosis or edema                                                                     Interventional Cardiology Post Cath Progress Note:                Subjective:   Patient feels well- no current complaints- Denies chest pain, shortness of breath. Denies pain, numbness, tingling or swelling around R groin access site        MEDICATIONS  (STANDING):  aspirin enteric coated 81 milliGRAM(s) Oral daily  atorvastatin 80 milliGRAM(s) Oral at bedtime  clopidogrel Tablet 75 milliGRAM(s) Oral daily  heparin  Infusion 700 Unit(s)/Hr (8 mL/Hr) IV Continuous <Continuous>  hydrALAZINE 50 milliGRAM(s) Oral every 8 hours  influenza   Vaccine 0.5 milliLiter(s) IntraMuscular once  insulin lispro (ADMELOG) corrective regimen sliding scale   SubCutaneous at bedtime  insulin lispro (ADMELOG) corrective regimen sliding scale   SubCutaneous three times a day before meals  metoprolol tartrate 12.5 milliGRAM(s) Oral two times a day    MEDICATIONS  (PRN):      Objective:  Vital Signs Last 24 Hrs  T(C): 36.6 (02 Apr 2024 04:00), Max: 37.8 (01 Apr 2024 18:00)  T(F): 97.8 (02 Apr 2024 04:00), Max: 100 (01 Apr 2024 18:00)  HR: 64 (02 Apr 2024 07:00) (61 - 104)  BP: 125/82 (01 Apr 2024 10:00) (125/82 - 125/82)  BP(mean): 100 (01 Apr 2024 10:00) (100 - 100)  RR: 26 (02 Apr 2024 07:00) (11 - 29)  SpO2: 96% (02 Apr 2024 07:00) (96% - 100%)    Parameters below as of 01 Apr 2024 19:00  Patient On (Oxygen Delivery Method): room air        04-01-24 @ 07:01  -  04-02-24 @ 07:00  --------------------------------------------------------  IN: 572 mL / OUT: 2050 mL / NET: -1478 mL                              11.2   8.28  )-----------( 222      ( 02 Apr 2024 06:18 )             34.6     04-02    140  |  104  |  14  ----------------------------<  155<H>  3.5   |  23  |  0.81    Ca    9.0      02 Apr 2024 00:50  Phos  3.9     04-02  Mg     2.2     04-02    TPro  7.0  /  Alb  4.0  /  TBili  0.4  /  DBili  x   /  AST  87<H>  /  ALT  31  /  AlkPhos  79  04-02    PT/INR - ( 01 Apr 2024 10:21 )   PT: 12.0 sec;   INR: 1.09 ratio         PTT - ( 02 Apr 2024 06:18 )  PTT:64.4 sec  Urinalysis Basic - ( 02 Apr 2024 00:50 )    Color: x / Appearance: x / SG: x / pH: x  Gluc: 155 mg/dL / Ketone: x  / Bili: x / Urobili: x   Blood: x / Protein: x / Nitrite: x   Leuk Esterase: x / RBC: x / WBC x   Sq Epi: x / Non Sq Epi: x / Bacteria: x    Cardiac Catheterization (04.01.24 @ 08:50)   Cath Lab Report    Diagnostic Cardiologist:       Nena Cohn DO   Interventional Cardiologist:   Nena Cohn DO   Fellow:                        Sherrell Villegas   Referring Physician:           Davide Church MD     Procedures Performed   Procedures:                1.    Arterial Access - Right Femoral   2.    Ultrasound Guided Access   3.    Diagnostic Coronary Angiography   4.    Left Heart Cath   5.    PCI: SATISH   6.    IABP     Indications:               Myocardial infarction with ST elevation  (STEMI)  ACS Less than 24 hours     Lab Visit Indication:      ACS less than or equal to 24 hours   PCI Status:                emergent     Conclusions:   1. Occluded culprit pRCA s/p PCI with SATISH   2. Severe atherosclerosis of the mLCx   3. Moderate atherosclerosis of the mLAD   4. LVEDP 21, LVEF 40%, posterior-basal hypokinesis   5. IABP placement   Recommendations:     Continue DAPT for at least 1 year and return for staged PCI of the  mLCx.  IABP was placed for ongoing CP, hypotension, BELEM.       Diagnostic Findings:     Coronary Angiography   The coronary circulation is right dominant.      LM   Left main artery: Angiography shows no disease.      LAD   Mid left anterior descending: Angiography shows moderate  atherosclerosis. There is a 40 % stenosis. Proximal left anterior  descending: Angiography shows moderate atherosclerosis.      CX   Mid circumflex: Angiography shows severe atherosclerosis. There is a  99 % stenosis.    RCA   Proximal right coronary artery: Angiography shows complete occlusion.  There is a 100 % stenosis.    TTE W or WO Ultrasound Enhancing Agent (04.01.24 @ 11:04)      CONCLUSIONS:      1. Left ventricular cavity is normal in size. Left ventricular wall thickness is normal. Left ventricular systolic function is normal with an ejection fraction of 62 % by Decker's method of disks. There are no regional wall motion abnormalities seen.   2. Normal left ventricular diastolic function, with normal filling pressure.   3. Normal right ventricular cavity size, with normal wall thickness, and normal systolic function.   4. Normal left andright atrial size.   5. No significant valvular disease.   6. No pericardial effusion seen.   7. No prior echocardiogram is available for comparison.              Physical Exam:  No apparent distress, alert and oriented times three, appropriate affect  JVD is not elevated, supple  Clear to auscultation with no wheezing, rhonchi or crackles  Regular rate and rhythm with no murmur, rub or gallop  Soft, non-tender, non-distended, no masses/guarding or rebound tenderness  R groin: Soft, non tender, no bleeding or hematoma, clean/dry/intact- RLE/LLE +2 palpable DP pulses, IABP in place  No clubbing, cyanosis or edema

## 2024-04-02 NOTE — PROGRESS NOTE ADULT - SUBJECTIVE AND OBJECTIVE BOX
PATIENT:  LOUIS DESAI  [id number]    CHIEF COMPLAINT:  Patient is a 53y old  Female who presents with a chief complaint of IWSTEMI (2024 07:28)      INTERVAL HISTORY: IABP removed, transitioned to coreg      REVIEW OF SYSTEMS:  Constitutional:     [x ] negative [ ] fevers [ ] chills [ ] weight loss [ ] weight gain  HEENT:                  [x ] negative [ ] dry eyes [ ] eye irritation [ ] postnasal drip [ ] nasal congestion  CV:                         [x ] negative  [ ] chest pain [ ] orthopnea [ ] palpitations [ ] murmur  Resp:                     [x ] negative [ ] cough [ ] shortness of breath [ ] dyspnea [ ] wheezing [ ] sputum [ ] hemoptysis  GI:                          [x ] negative [ ] nausea [ ] vomiting [ ] diarrhea [ ] constipation [ ] abd pain [ ] dysphagia   :                        [x ] negative [ ] dysuria [ ] nocturia [ ] hematuria [ ] increased urinary frequency  Musculoskeletal: [x ] negative [ ] back pain [ ] myalgias [ ] arthralgias [ ] fracture  Skin:                       [ x] negative [ ] rash [ ] itch  Neurological:        [x ] negative [ ] headache [ ] dizziness [ ] syncope [ ] weakness [ ] numbness      MEDICATIONS:  MEDICATIONS  (STANDING):  aspirin enteric coated 81 milliGRAM(s) Oral daily  atorvastatin 80 milliGRAM(s) Oral at bedtime  carvedilol 6.25 milliGRAM(s) Oral every 12 hours  clopidogrel Tablet 75 milliGRAM(s) Oral daily  influenza   Vaccine 0.5 milliLiter(s) IntraMuscular once  insulin lispro (ADMELOG) corrective regimen sliding scale   SubCutaneous at bedtime  insulin lispro (ADMELOG) corrective regimen sliding scale   SubCutaneous three times a day before meals    MEDICATIONS  (PRN):    ALLERGIES:  Allergies    No Known Allergies    Intolerances        OBJECTIVE:  ICU Vital Signs Last 24 Hrs  T(C): 37 (2024 17:00), Max: 37.3 (2024 12:00)  T(F): 98.6 (2024 17:00), Max: 99.2 (2024 12:00)  HR: 99 (2024 18:00) (61 - 99)  BP: 132/76 (2024 18:00) (132/76 - 132/76)  BP(mean): 99 (2024 18:00) (99 - 99)  ABP: --  ABP(mean): --  RR: 16 (2024 18:00) (11 - 29)  SpO2: 96% (2024 18:00) (96% - 98%)    O2 Parameters below as of 2024 17:00  Patient On (Oxygen Delivery Method): room air    Adult Advanced Hemodynamics Last 24 Hrs  CVP(mm Hg): --  CVP(cm H2O): --  CO: --  CI: --  PA: --  PA(mean): --  PCWP: --  SVR: --  SVRI: --  PVR: --  PVRI: --  CAPILLARY BLOOD GLUCOSE      POCT Blood Glucose.: 140 mg/dL (2024 17:29)  POCT Blood Glucose.: 161 mg/dL (2024 11:58)  POCT Blood Glucose.: 144 mg/dL (2024 08:25)  POCT Blood Glucose.: 124 mg/dL (2024 22:15)    CAPILLARY BLOOD GLUCOSE      POCT Blood Glucose.: 140 mg/dL (2024 17:29)    I&O's Summary    2024 07:01  -  2024 07:00  --------------------------------------------------------  IN: 572 mL / OUT: 2050 mL / NET: -1478 mL    2024 07:01  -  2024 18:58  --------------------------------------------------------  IN: 64 mL / OUT: 675 mL / NET: -611 mL      Daily     Daily Weight in k.4 (2024 04:00)    PHYSICAL EXAMINATION:  General: in no acute distress  HEENT: PERRLA, EOMI, moist mucous membranes  Neurology: A&Ox3, nonfocal, HADDAD x 4  Respiratory: CTA B/L, normal respiratory effort, no wheezes, crackles, rales  CV: RRR, S1S2, no murmurs, rubs or gallops  Abdominal: Soft, NT, ND +BS  Extremities: No edema, + peripheral pulses  Skin: warm, dry. IABP removal site c/d/i    LABS:                          11.2   8.28  )-----------( 222      ( 2024 06:18 )             34.6     04-02    140  |  104  |  14  ----------------------------<  155<H>  3.5   |  23  |  0.81    Ca    9.0      2024 00:50  Phos  3.9     04-02  Mg     2.2     04-02    TPro  7.0  /  Alb  4.0  /  TBili  0.4  /  DBili  x   /  AST  87<H>  /  ALT  31  /  AlkPhos  79  04-02    LIVER FUNCTIONS - ( 2024 00:50 )  Alb: 4.0 g/dL / Pro: 7.0 g/dL / ALK PHOS: 79 U/L / ALT: 31 U/L / AST: 87 U/L / GGT: x           PT/INR - ( 2024 10:21 )   PT: 12.0 sec;   INR: 1.09 ratio         PTT - ( 2024 12:09 )  PTT:76.6 sec    CARDIAC MARKERS ( 2024 10:21 )  x     / x     / 351 U/L / x     / 26.9 ng/mL      Urinalysis Basic - ( 2024 00:50 )    Color: x / Appearance: x / SG: x / pH: x  Gluc: 155 mg/dL / Ketone: x  / Bili: x / Urobili: x   Blood: x / Protein: x / Nitrite: x   Leuk Esterase: x / RBC: x / WBC x   Sq Epi: x / Non Sq Epi: x / Bacteria: x        TELEMETRY: reviewed    EKG: reviewed    IMAGING: reviewed

## 2024-04-02 NOTE — PROGRESS NOTE ADULT - ASSESSMENT
Assessment	  52 y/o female with pmhx of DM2 (on insulin and metformin) presented to OSH with worsening left sided chest pain with radiation to left arm/jaw, found to have IWSTEMI, transferred to [facility] for emergent cath. 100% occlusion of RCA with 1 SATISH, IABP placement for perfusion, now s/p staged PCI of Lcx w/ SATISH x1 and IABP removal.    NEURO:  - A&O x 3, no focal deficits   - Continue to monitor mental status per ICU protocol     PULM:  - saturating well on room air  - Continue to monitor resp status, maintain >94%     CV:  STEMI   - 4/1 % occlusion of RCA as culprit vessel; s/p 1 SATISH   - TTE 4/1: EF 62%  - 4/2 staged PCI SATISH x1 to mLcx  - IABP removed, continue groin monitoring  - euvolemic on exam  - DAPT: asa/plavix, high intensity statin  - GDMT: coreg 6.25, uptitrate as tolerated  - continue strict I&O, daily weights, perfusion indices monitoring    RENAL:  - sCr within normal limits  - continue strict I&O, electrolyte monitoring  - trend daily, Keep Mg>2, K>4    GI:  - continue dash/diabetic diet  - monitor for BM    ENDO:  #DM2: HbA1c 6.6 on admission   - Insulin and metformin at home  - ISS while inpatient, FS ac/hs    - lipid panel noted, continue lipitor  - TSH within normal limits    HEMATOLOGIC:  - H/H and platelets stable   - trend daily  #DVT ppx: heparin sub q in am    ID:  # Leukocytosis   - likely reactive in setting of stemi   - no s/sx of infection  - Continue to trend WBC and fever curve    #full code  #Lines:   R IABP 4/1 - 4/2    ======================= DISPOSITION  =====================  [X] Critical   [ ] Guarded    [ ] Stable    [X] Maintain in CICU  [ ] Downgrade to Telemetry  [ ] Discharge Home    Patient requires continuous monitoring with bedside rhythm monitoring, pulse ox monitoring, and intermittent blood gas analysis. Care plan discussed with ICU care team. Patient remained critical and at risk for life threatening decompensation.  Patient seen, examined and plan discussed with CCU team during rounds.     I have personally provided 30 minutes of critical care time excluding time spent on separate procedures, in addition to initial critical care time provided by the CICU Attending, Dr. Church.    Maddie Camarillo [facility]-BC

## 2024-04-02 NOTE — PROGRESS NOTE ADULT - ASSESSMENT
53y Female PMH DM2 initially presented to [facility] w/ L sided chest pain found to have STEMI transferred to [facility] s/p [facility] PCI pRCA SATISH x 1 via RFA, IABP placed on 4/1.  Needs staged [facility] to [facility]    - Staged procedure to mLCX today 4/2  - R groin site stable- no hematoma  - IABP in place, wean post procedure per CICU  - Heparin gtt while IABP in place  - Continue DAPT (aspirin 81mg and clopidogrel 75mg) x 12 months minimum  - Continue atorvastatin 80mg daily  - TTE 4/1- EF 62%, no regional wall motion abnormalities  - Medication adherence stressed to patient with discussion of risks of non-compliance including stent thrombosis  - Recommend a heart healthy diet which includes a variety of fruits and vegetables, whole grains, low fat dairy products, legumes and skinless poultry and fish; food prepared with little or no salt and minimize processed foods  - Avoid using NSAIDs  (Aleve, Motrin, ibuprofen, naproxen) while on DAPT, please utilize Tylenol for pain control (not to exceed 4gm in 24 hours)  - general cardiology consult once transferred out of CICU  - patient prefers to follow up with Dr. Cohn as outpatient in 2 weeks upon discharge from the hospital  - Appreciate CICU care

## 2024-04-02 NOTE — PROGRESS NOTE ADULT - ASSESSMENT
Patient is 52 y/o female with pmhx of DM2 (on insulin and metformin). presented to OSH with worsening left sided chest pain with radiation to left arm/jaw. was found to have stemi, transferred to [facility] for emergent cath. 100% occlusion of RCA with 1 SATISH, IABP placement for perfusion. plans for staged Lcx. CICU for further management.     NEURO:  - A&O x 3, no focal deficits   - Continue to monitor mental status per ICU protocol     CV:  STEMI   - Pt with + troponin, stemi at OSH.   - 4/1 [facility] 100% occlusion of RCA as culprit vessel; s/p 1 SATISH   - TTE 4/1: EF 62%  - IABP placed for perfusion, continue 1:1 w/ heparin gtt, plans to wean after staged Lcx   - DAPT: asa/plavix  - high intensity statin, Lipitor 80mg   - hydralazine 25 TID for afterload reduction  - start BB after cath today as tolerated   - Plan for staged LCX today 4/2   - continue strict I&O, daily weights, perfusion indices monitoring      PULM:  - Intermittently requiring 2L nc   - Satting well 98-99%   - Continue to monitor resp status, maintain >94%     RENAL:  - BUN/Cr 14/0.81, wnl, appears euvolemic on exam   - Lytes unremarkable   - trend and replete lytes prn   - Keep Mg>2, K>4    GI:  - DASH diet   - Slight uptre  - Monitor for BM     ENDO:  #DM2: HbA1c 6.6 on admission   - Insulin and metformin at home  - ISS  - Monitor glucose on bmp, FS  - f/u lipid panel     HEMATOLOGIC:  - H/H stable 12/38   - no active issues   DVT ppx Heparin drip once PTT normalizes  Heparin while IABP in place     ID:  - Leukocytosis 11, likely reactive in setting of stemi   - No evidence to prove infectious etiology at this time, pt remains afebroiile with no active complaints   - Continue to trend white count    Lines   R IABP    Patient is 52 y/o female with pmhx of DM2 (on insulin and metformin). presented to OSH with worsening left sided chest pain with radiation to left arm/jaw. was found to have stemi, transferred to [facility] for emergent cath. 100% occlusion of RCA with 1 SATISH, IABP placement for perfusion. plans for staged Lcx. CICU for further management.     NEURO:  - A&O x 3, no focal deficits   - Continue to monitor mental status per ICU protocol     CV:  IWSTEMI   - Pt with + troponin, stemi at OSH, CE now downtrending, no need to continue to trend   - 4/1 [facility] 100% occlusion of RCA as culprit vessel; s/p 1 SATISH   - TTE 4/1: EF 62%  - IABP placed for perfusion, continue 1:1 w/ heparin gtt, plan to wean after staged Lcx   - DAPT: asa/plavix  - high intensity statin, Lipitor 80mg   - hydralazine 25 TID for afterload reduction  - Metoprolol 12.5mg, uptitrate as tolerated   - Plan for staged LCX today 4/2   - continue strict I&O, daily weights, perfusion indices monitoring    PULM:  - Intermittently requiring 2L nc   - Satting well 98-99%   - Continue to monitor resp status, maintain >94%     RENAL:  - BUN/Cr 14/0.81, wnl, appears euvolemic on exam   - Lytes unremarkable   - trend and replete lytes prn   - Keep Mg>2, K>4    GI:  - DASH diet   - Slight increase in LFTs, not uncommon in setting of stemi/tyelnol therapy   - Monitor for BM     ENDO:  DM2: HbA1c 6.6 on admission   - Insulin and metformin at home  - ISS  - Monitor glucose on bmp, FS  - f/u lipid panel     HEMATOLOGIC:  - H/H stable 12/38   - no active issues   DVT ppx heparin gtt with IABP     ID:  - Leukocytosis 11 on admission, now resolved at 8   - No evidence to prove infectious etiology at this time, pt remains afebrile with no active complaints   - Continue to trend WBC and fever curve     Lines   R IABP 4/1

## 2024-04-02 NOTE — PROGRESS NOTE ADULT - ATTENDING COMMENTS
52yo woman who presented with acute RCA STEMI s/p PCI with good result, requiring IABP for HD instability during procedure. Also residual LCx disease going for staged PCI today. LVEF preserved.    Neuro no issues  CV: RCA stemi with multivessel disease, await staged intervetion to LCx 4/2. Cont ASA, Plavix, statin and start BB/ ACEi prior to DC. IABP at 1:1. Increasing hydralazine.  Pulm on RA  GI DASH diet  Renal Cr wnl, LVEDP of 21 and negative 1L without issue  ID no infection  Heme heparin for IABP  Endo BG controlled A1c of 6.6% on ISS  Lines IABP (4/1) out later today post procedure

## 2024-04-02 NOTE — PROGRESS NOTE ADULT - SUBJECTIVE AND OBJECTIVE BOX
Patient is a 53y old  Female who presents with a chief complaint of STEMI (2024 19:50)      INTERVAL HISTORYOVERNIGHT EVENTS:  -No acute events    SUBJECTIVE  - Patient seen and evaluated at bedside  - Patient reports presence of   - Patient reports absence of fevers, chills, HA, lighted headedness, dizziness, nausea, emesis, chest pain, dyspnea, palpitations, abd pain, diarrhea, urinary symptoms, skin color changes or rashes, or LE edema     MEDICATIONS:  MEDICATIONS  (STANDING):  aspirin enteric coated 81 milliGRAM(s) Oral daily  atorvastatin 80 milliGRAM(s) Oral at bedtime  clopidogrel Tablet 75 milliGRAM(s) Oral daily  heparin  Infusion 700 Unit(s)/Hr (8 mL/Hr) IV Continuous <Continuous>  hydrALAZINE 50 milliGRAM(s) Oral every 8 hours  influenza   Vaccine 0.5 milliLiter(s) IntraMuscular once  insulin lispro (ADMELOG) corrective regimen sliding scale   SubCutaneous three times a day before meals  insulin lispro (ADMELOG) corrective regimen sliding scale   SubCutaneous at bedtime  metoprolol tartrate 12.5 milliGRAM(s) Oral two times a day    MEDICATIONS  (PRN):      ALLERGIES:  Allergies    No Known Allergies    Intolerances        OBJECTIVE:  ICU Vital Signs Last 24 Hrs  T(C): 36.6 (2024 04:00), Max: 37.8 (2024 18:00)  T(F): 97.8 (2024 04:00), Max: 100 (2024 18:00)  HR: 80 (2024 06:00) (61 - 104)  BP: 125/82 (2024 10:00) (125/82 - 125/82)  BP(mean): 100 (2024 10:00) (100 - 100)  ABP: --  ABP(mean): --  RR: 21 (2024 06:00) (11 - 29)  SpO2: 97% (2024 06:00) (96% - 100%)    O2 Parameters below as of 2024 19:00  Patient On (Oxygen Delivery Method): room air            Adult Advanced Hemodynamics Last 24 Hrs    CAPILLARY BLOOD GLUCOSE      POCT Blood Glucose.: 124 mg/dL (2024 22:15)  POCT Blood Glucose.: 131 mg/dL (2024 16:37)  POCT Blood Glucose.: 141 mg/dL (2024 12:21)    CAPILLARY BLOOD GLUCOSE      POCT Blood Glucose.: 124 mg/dL (2024 22:15)    I&O's Summary    2024 07:01  -  2024 07:00  --------------------------------------------------------  IN: 572 mL / OUT: 2050 mL / NET: -1478 mL      Daily Height in cm: 160.02 (2024 09:50)    Daily Weight in k.4 (2024 04:00)    PHYSICAL EXAM:  General: NAD, well-groomed, well-developed  Eyes: Conjunctiva and sclera clear  ENMT: Moist mucous membranes  Neck: No palpable pre-auricular, post-auricular, occipital, mandibular, submental, supra-clavicular, or infra-clavicular lymph nodes   Chest: Clear to auscultation bilaterally; no rales, rhonchi, or wheezing  Heart: Regular rate and rhythm; normal S1 and S2; no murmurs, rubs, or gallops  Abd: Soft, nontender, nondistended  Nervous System: AAOX3  Psych: Appropriate affect   Ext: no peripheral LE edema bilaterally  Lines/Tubes: IV peripheral    LABS:                          11.2   8.28  )-----------( 222      ( 2024 06:18 )             34.6     04-02    140  |  104  |  14  ----------------------------<  155<H>  3.5   |  23  |  0.81    Ca    9.0      2024 00:50  Phos  3.9     04-02  Mg     2.2     04-02    TPro  7.0  /  Alb  4.0  /  TBili  0.4  /  DBili  x   /  AST  87<H>  /  ALT  31  /  AlkPhos  79  04-02    LIVER FUNCTIONS - ( 2024 00:50 )  Alb: 4.0 g/dL / Pro: 7.0 g/dL / ALK PHOS: 79 U/L / ALT: 31 U/L / AST: 87 U/L / GGT: x           PT/INR - ( 2024 10:21 )   PT: 12.0 sec;   INR: 1.09 ratio         PTT - ( 2024 06:18 )  PTT:64.4 sec  Creatine Kinase, Serum: 351 U/L ( @ 10:21)  CKMB Units: 26.9 ng/mL ( @ 10:21)    CARDIAC MARKERS ( 2024 10:21 )  x     / x     / 351 U/L / x     / 26.9 ng/mL      Urinalysis Basic - ( 2024 00:50 )    Color: x / Appearance: x / SG: x / pH: x  Gluc: 155 mg/dL / Ketone: x  / Bili: x / Urobili: x   Blood: x / Protein: x / Nitrite: x   Leuk Esterase: x / RBC: x / WBC x   Sq Epi: x / Non Sq Epi: x / Bacteria: x          RADIOLOGY & ADDITIONAL TESTS:    Consultant(s) Notes Reviewed:  Yes    Care Discussed with Consultants/Other Providers [ x] YES  [ ] NO   Patient is a 53y old  Female who presents with a chief complaint of STEMI (2024 19:50)      INTERVAL HISTORYOVERNIGHT EVENTS:  started on Hydral 25mg for afterload reduction, Metoprolol 12.5mg     SUBJECTIVE  - Patient seen and evaluated at bedside  - Patient reports she is chest pain free now, awaiting staged procedure today   - Patient reports absence of fevers, chills, HA, lighted headedness, dizziness, nausea, emesis, chest pain, dyspnea, palpitations, abd pain, diarrhea, urinary symptoms, skin color changes or rashes, or LE edema     MEDICATIONS:  MEDICATIONS  (STANDING):  aspirin enteric coated 81 milliGRAM(s) Oral daily  atorvastatin 80 milliGRAM(s) Oral at bedtime  clopidogrel Tablet 75 milliGRAM(s) Oral daily  heparin  Infusion 700 Unit(s)/Hr (8 mL/Hr) IV Continuous <Continuous>  hydrALAZINE 50 milliGRAM(s) Oral every 8 hours  influenza   Vaccine 0.5 milliLiter(s) IntraMuscular once  insulin lispro (ADMELOG) corrective regimen sliding scale   SubCutaneous three times a day before meals  insulin lispro (ADMELOG) corrective regimen sliding scale   SubCutaneous at bedtime  metoprolol tartrate 12.5 milliGRAM(s) Oral two times a day    MEDICATIONS  (PRN):      ALLERGIES:  Allergies    No Known Allergies    Intolerances        OBJECTIVE:  ICU Vital Signs Last 24 Hrs  T(C): 36.6 (2024 04:00), Max: 37.8 (2024 18:00)  T(F): 97.8 (2024 04:00), Max: 100 (2024 18:00)  HR: 80 (2024 06:00) (61 - 104)  BP: 125/82 (2024 10:00) (125/82 - 125/82)  BP(mean): 100 (2024 10:00) (100 - 100)  ABP: --  ABP(mean): --  RR: 21 (2024 06:00) (11 - 29)  SpO2: 97% (2024 06:00) (96% - 100%)    O2 Parameters below as of 2024 19:00  Patient On (Oxygen Delivery Method): room air            Adult Advanced Hemodynamics Last 24 Hrs    CAPILLARY BLOOD GLUCOSE      POCT Blood Glucose.: 124 mg/dL (2024 22:15)  POCT Blood Glucose.: 131 mg/dL (2024 16:37)  POCT Blood Glucose.: 141 mg/dL (2024 12:21)    CAPILLARY BLOOD GLUCOSE      POCT Blood Glucose.: 124 mg/dL (2024 22:15)    I&O's Summary    2024 07:01  -  2024 07:00  --------------------------------------------------------  IN: 572 mL / OUT: 2050 mL / NET: -1478 mL      Daily Height in cm: 160.02 (2024 09:50)    Daily Weight in k.4 (2024 04:00)    PHYSICAL EXAM:  General: NAD, well-groomed, well-developed, no distress   Eyes: Conjunctiva and sclera clear  ENMT: Moist mucous membranes  Neck: No palpable pre-auricular, post-auricular, occipital, mandibular, submental, supra-clavicular, or infra-clavicular lymph nodes   Chest: Clear to auscultation bilaterally; no rales, rhonchi, or wheezing  Heart: Regular rate and rhythm; normal S1 and S2; no murmurs, rubs, or gallops  Abd: Soft, nontender, nondistended  Nervous System: AAOX3, no focal deficits   Psych: Appropriate affect   Ext: no peripheral LE edema bilaterally, pulses 2+ b/l   Lines/Tubes: IV peripheral, Right femoral IABP     LABS:                          11.2   8.28  )-----------( 222      ( 2024 06:18 )             34.6     04-02    140  |  104  |  14  ----------------------------<  155<H>  3.5   |  23  |  0.81    Ca    9.0      2024 00:50  Phos  3.9     04-02  Mg     2.2     04-02    TPro  7.0  /  Alb  4.0  /  TBili  0.4  /  DBili  x   /  AST  87<H>  /  ALT  31  /  AlkPhos  79  04-02    LIVER FUNCTIONS - ( 2024 00:50 )  Alb: 4.0 g/dL / Pro: 7.0 g/dL / ALK PHOS: 79 U/L / ALT: 31 U/L / AST: 87 U/L / GGT: x           PT/INR - ( 2024 10:21 )   PT: 12.0 sec;   INR: 1.09 ratio         PTT - ( 2024 06:18 )  PTT:64.4 sec  Creatine Kinase, Serum: 351 U/L ( @ 10:21)  CKMB Units: 26.9 ng/mL ( @ 10:21)    CARDIAC MARKERS ( 2024 10:21 )  x     / x     / 351 U/L / x     / 26.9 ng/mL      Urinalysis Basic - ( 2024 00:50 )    Color: x / Appearance: x / SG: x / pH: x  Gluc: 155 mg/dL / Ketone: x  / Bili: x / Urobili: x   Blood: x / Protein: x / Nitrite: x   Leuk Esterase: x / RBC: x / WBC x   Sq Epi: x / Non Sq Epi: x / Bacteria: x          RADIOLOGY & ADDITIONAL TESTS:    Consultant(s) Notes Reviewed:  Yes    Care Discussed with Consultants/Other Providers [ x] YES  [ ] NO

## 2024-04-03 ENCOUNTER — TRANSCRIPTION ENCOUNTER (OUTPATIENT)
Age: 54
End: 2024-04-03

## 2024-04-03 VITALS
OXYGEN SATURATION: 96 % | HEART RATE: 82 BPM | TEMPERATURE: 99 F | RESPIRATION RATE: 27 BRPM | DIASTOLIC BLOOD PRESSURE: 53 MMHG | SYSTOLIC BLOOD PRESSURE: 107 MMHG

## 2024-04-03 LAB
ALBUMIN SERPL ELPH-MCNC: 4.2 G/DL — SIGNIFICANT CHANGE UP (ref 3.3–5)
ALP SERPL-CCNC: 85 U/L — SIGNIFICANT CHANGE UP (ref 40–120)
ALT FLD-CCNC: 32 U/L — SIGNIFICANT CHANGE UP (ref 10–45)
ANION GAP SERPL CALC-SCNC: 16 MMOL/L — SIGNIFICANT CHANGE UP (ref 5–17)
AST SERPL-CCNC: 52 U/L — HIGH (ref 10–40)
BASOPHILS # BLD AUTO: 0.03 K/UL — SIGNIFICANT CHANGE UP (ref 0–0.2)
BASOPHILS NFR BLD AUTO: 0.4 % — SIGNIFICANT CHANGE UP (ref 0–2)
BILIRUB SERPL-MCNC: 0.8 MG/DL — SIGNIFICANT CHANGE UP (ref 0.2–1.2)
BUN SERPL-MCNC: 12 MG/DL — SIGNIFICANT CHANGE UP (ref 7–23)
CALCIUM SERPL-MCNC: 9.2 MG/DL — SIGNIFICANT CHANGE UP (ref 8.4–10.5)
CHLORIDE SERPL-SCNC: 103 MMOL/L — SIGNIFICANT CHANGE UP (ref 96–108)
CO2 SERPL-SCNC: 20 MMOL/L — LOW (ref 22–31)
CREAT SERPL-MCNC: 0.64 MG/DL — SIGNIFICANT CHANGE UP (ref 0.5–1.3)
EGFR: 106 ML/MIN/1.73M2 — SIGNIFICANT CHANGE UP
EOSINOPHIL # BLD AUTO: 0.05 K/UL — SIGNIFICANT CHANGE UP (ref 0–0.5)
EOSINOPHIL NFR BLD AUTO: 0.6 % — SIGNIFICANT CHANGE UP (ref 0–6)
GLUCOSE BLDC GLUCOMTR-MCNC: 192 MG/DL — HIGH (ref 70–99)
GLUCOSE BLDC GLUCOMTR-MCNC: 219 MG/DL — HIGH (ref 70–99)
GLUCOSE SERPL-MCNC: 183 MG/DL — HIGH (ref 70–99)
HCT VFR BLD CALC: 36.9 % — SIGNIFICANT CHANGE UP (ref 34.5–45)
HGB BLD-MCNC: 12.2 G/DL — SIGNIFICANT CHANGE UP (ref 11.5–15.5)
IMM GRANULOCYTES NFR BLD AUTO: 0.2 % — SIGNIFICANT CHANGE UP (ref 0–0.9)
LYMPHOCYTES # BLD AUTO: 2.02 K/UL — SIGNIFICANT CHANGE UP (ref 1–3.3)
LYMPHOCYTES # BLD AUTO: 24.9 % — SIGNIFICANT CHANGE UP (ref 13–44)
MAGNESIUM SERPL-MCNC: 2.3 MG/DL — SIGNIFICANT CHANGE UP (ref 1.6–2.6)
MCHC RBC-ENTMCNC: 29 PG — SIGNIFICANT CHANGE UP (ref 27–34)
MCHC RBC-ENTMCNC: 33.1 GM/DL — SIGNIFICANT CHANGE UP (ref 32–36)
MCV RBC AUTO: 87.6 FL — SIGNIFICANT CHANGE UP (ref 80–100)
MONOCYTES # BLD AUTO: 0.62 K/UL — SIGNIFICANT CHANGE UP (ref 0–0.9)
MONOCYTES NFR BLD AUTO: 7.7 % — SIGNIFICANT CHANGE UP (ref 2–14)
NEUTROPHILS # BLD AUTO: 5.36 K/UL — SIGNIFICANT CHANGE UP (ref 1.8–7.4)
NEUTROPHILS NFR BLD AUTO: 66.2 % — SIGNIFICANT CHANGE UP (ref 43–77)
NRBC # BLD: 0 /100 WBCS — SIGNIFICANT CHANGE UP (ref 0–0)
PHOSPHATE SERPL-MCNC: 3.3 MG/DL — SIGNIFICANT CHANGE UP (ref 2.5–4.5)
PLATELET # BLD AUTO: 213 K/UL — SIGNIFICANT CHANGE UP (ref 150–400)
POTASSIUM SERPL-MCNC: 3.7 MMOL/L — SIGNIFICANT CHANGE UP (ref 3.5–5.3)
POTASSIUM SERPL-SCNC: 3.7 MMOL/L — SIGNIFICANT CHANGE UP (ref 3.5–5.3)
PROT SERPL-MCNC: 7.6 G/DL — SIGNIFICANT CHANGE UP (ref 6–8.3)
RBC # BLD: 4.21 M/UL — SIGNIFICANT CHANGE UP (ref 3.8–5.2)
RBC # FLD: 12.4 % — SIGNIFICANT CHANGE UP (ref 10.3–14.5)
SODIUM SERPL-SCNC: 139 MMOL/L — SIGNIFICANT CHANGE UP (ref 135–145)
WBC # BLD: 8.1 K/UL — SIGNIFICANT CHANGE UP (ref 3.8–10.5)
WBC # FLD AUTO: 8.1 K/UL — SIGNIFICANT CHANGE UP (ref 3.8–10.5)

## 2024-04-03 PROCEDURE — C1889: CPT

## 2024-04-03 PROCEDURE — 85610 PROTHROMBIN TIME: CPT

## 2024-04-03 PROCEDURE — 82553 CREATINE MB FRACTION: CPT

## 2024-04-03 PROCEDURE — 36415 COLL VENOUS BLD VENIPUNCTURE: CPT

## 2024-04-03 PROCEDURE — 93926 LOWER EXTREMITY STUDY: CPT

## 2024-04-03 PROCEDURE — C9600: CPT | Mod: LC

## 2024-04-03 PROCEDURE — 86901 BLOOD TYPING SEROLOGIC RH(D): CPT

## 2024-04-03 PROCEDURE — C1874: CPT

## 2024-04-03 PROCEDURE — 82550 ASSAY OF CK (CPK): CPT

## 2024-04-03 PROCEDURE — 84702 CHORIONIC GONADOTROPIN TEST: CPT

## 2024-04-03 PROCEDURE — 93458 L HRT ARTERY/VENTRICLE ANGIO: CPT | Mod: 59

## 2024-04-03 PROCEDURE — 84100 ASSAY OF PHOSPHORUS: CPT

## 2024-04-03 PROCEDURE — C1894: CPT

## 2024-04-03 PROCEDURE — 84443 ASSAY THYROID STIM HORMONE: CPT

## 2024-04-03 PROCEDURE — 85025 COMPLETE CBC W/AUTO DIFF WBC: CPT

## 2024-04-03 PROCEDURE — C9606: CPT | Mod: RC

## 2024-04-03 PROCEDURE — 85730 THROMBOPLASTIN TIME PARTIAL: CPT

## 2024-04-03 PROCEDURE — 93926 LOWER EXTREMITY STUDY: CPT | Mod: 26,RT

## 2024-04-03 PROCEDURE — 83036 HEMOGLOBIN GLYCOSYLATED A1C: CPT

## 2024-04-03 PROCEDURE — 80053 COMPREHEN METABOLIC PANEL: CPT

## 2024-04-03 PROCEDURE — 86850 RBC ANTIBODY SCREEN: CPT

## 2024-04-03 PROCEDURE — 93306 TTE W/DOPPLER COMPLETE: CPT

## 2024-04-03 PROCEDURE — 93010 ELECTROCARDIOGRAM REPORT: CPT

## 2024-04-03 PROCEDURE — 33967 INSERT I-AORT PERCUT DEVICE: CPT

## 2024-04-03 PROCEDURE — 85027 COMPLETE CBC AUTOMATED: CPT

## 2024-04-03 PROCEDURE — 83735 ASSAY OF MAGNESIUM: CPT

## 2024-04-03 PROCEDURE — 93005 ELECTROCARDIOGRAM TRACING: CPT

## 2024-04-03 PROCEDURE — C1725: CPT

## 2024-04-03 PROCEDURE — 80061 LIPID PANEL: CPT

## 2024-04-03 PROCEDURE — C1887: CPT

## 2024-04-03 PROCEDURE — 99231 SBSQ HOSP IP/OBS SF/LOW 25: CPT

## 2024-04-03 PROCEDURE — 86900 BLOOD TYPING SEROLOGIC ABO: CPT

## 2024-04-03 PROCEDURE — 93356 MYOCRD STRAIN IMG SPCKL TRCK: CPT

## 2024-04-03 PROCEDURE — 99233 SBSQ HOSP IP/OBS HIGH 50: CPT

## 2024-04-03 PROCEDURE — 82962 GLUCOSE BLOOD TEST: CPT

## 2024-04-03 PROCEDURE — 85520 HEPARIN ASSAY: CPT

## 2024-04-03 PROCEDURE — C1769: CPT

## 2024-04-03 PROCEDURE — 84484 ASSAY OF TROPONIN QUANT: CPT

## 2024-04-03 PROCEDURE — 71045 X-RAY EXAM CHEST 1 VIEW: CPT

## 2024-04-03 RX ORDER — ATORVASTATIN CALCIUM 80 MG/1
1 TABLET, FILM COATED ORAL
Qty: 30 | Refills: 0
Start: 2024-04-03 | End: 2024-05-02

## 2024-04-03 RX ORDER — DAPAGLIFLOZIN 10 MG/1
1 TABLET, FILM COATED ORAL
Qty: 30 | Refills: 0
Start: 2024-04-03 | End: 2024-05-02

## 2024-04-03 RX ORDER — POTASSIUM CHLORIDE 20 MEQ
40 PACKET (EA) ORAL ONCE
Refills: 0 | Status: COMPLETED | OUTPATIENT
Start: 2024-04-03 | End: 2024-04-03

## 2024-04-03 RX ORDER — INSULIN LISPRO 100/ML
4 VIAL (ML) SUBCUTANEOUS
Qty: 0 | Refills: 0 | DISCHARGE
Start: 2024-04-03

## 2024-04-03 RX ORDER — CLOPIDOGREL BISULFATE 75 MG/1
1 TABLET, FILM COATED ORAL
Qty: 30 | Refills: 0
Start: 2024-04-03 | End: 2024-05-02

## 2024-04-03 RX ORDER — CARVEDILOL PHOSPHATE 80 MG/1
1 CAPSULE, EXTENDED RELEASE ORAL
Qty: 60 | Refills: 0
Start: 2024-04-03 | End: 2024-05-02

## 2024-04-03 RX ORDER — ATORVASTATIN CALCIUM 80 MG/1
1 TABLET, FILM COATED ORAL
Refills: 0 | DISCHARGE

## 2024-04-03 RX ORDER — ASPIRIN/CALCIUM CARB/MAGNESIUM 324 MG
1 TABLET ORAL
Refills: 0 | DISCHARGE

## 2024-04-03 RX ORDER — ASPIRIN/CALCIUM CARB/MAGNESIUM 324 MG
1 TABLET ORAL
Qty: 30 | Refills: 0
Start: 2024-04-03 | End: 2024-05-02

## 2024-04-03 RX ADMIN — Medication 81 MILLIGRAM(S): at 12:19

## 2024-04-03 RX ADMIN — CARVEDILOL PHOSPHATE 12.5 MILLIGRAM(S): 80 CAPSULE, EXTENDED RELEASE ORAL at 05:42

## 2024-04-03 RX ADMIN — Medication 1: at 12:19

## 2024-04-03 RX ADMIN — HEPARIN SODIUM 5000 UNIT(S): 5000 INJECTION INTRAVENOUS; SUBCUTANEOUS at 05:42

## 2024-04-03 RX ADMIN — Medication 2: at 07:26

## 2024-04-03 RX ADMIN — Medication 40 MILLIEQUIVALENT(S): at 05:42

## 2024-04-03 RX ADMIN — CLOPIDOGREL BISULFATE 75 MILLIGRAM(S): 75 TABLET, FILM COATED ORAL at 12:19

## 2024-04-03 NOTE — DISCHARGE NOTE NURSING/CASE MANAGEMENT/SOCIAL WORK - NSDCFUADDAPPT_GEN_ALL_CORE_FT
No heavy lifting, strenuous activity, bending, straining, or unnecessary stair climbing for 2 weeks. No driving for 2 days. You may shower 24 hours following the procedure but avoid baths/swimming for 1 week. Check your groin site and wrist site for bleeding and/or swelling daily following procedure and call your doctor immediately if it occurs or if you experience increased pain at the site. Follow up with your cardiologist in 1-2 weeks. You may call [facility]  if you have any questions/concerns regarding your procedure [phone].     APPTS ARE READY TO BE MADE: [x] YES    Best Family or Patient Contact (if needed):    Additional Information about above appointments (if needed):    1: Dr. Nena Cohn  2:   3:     Other comments or requests:

## 2024-04-03 NOTE — DISCHARGE NOTE PROVIDER - NPI NUMBER (FOR SYSADMIN USE ONLY) :
ELEN requested d/c-readmit orders from MD for pt to transfer to [facility]. ELEN spoke with Eloisa who stated RN call report to [phone] once orders written.    Tati Charles LMSW  [id number]     [[phone]]

## 2024-04-03 NOTE — DIETITIAN INITIAL EVALUATION ADULT - PERTINENT MEDS FT
MEDICATIONS  (STANDING):  aspirin enteric coated 81 milliGRAM(s) Oral daily  atorvastatin 80 milliGRAM(s) Oral at bedtime  carvedilol 12.5 milliGRAM(s) Oral every 12 hours  clopidogrel Tablet 75 milliGRAM(s) Oral daily  heparin   Injectable 5000 Unit(s) SubCutaneous every 8 hours  insulin lispro (ADMELOG) corrective regimen sliding scale   SubCutaneous three times a day before meals  insulin lispro (ADMELOG) corrective regimen sliding scale   SubCutaneous at bedtime

## 2024-04-03 NOTE — DIETITIAN INITIAL EVALUATION ADULT - REASON FOR ADMISSION
per chart: 52 y/o female with pmhx of DM2 (on insulin and metformin) presented to OSH with worsening left sided chest pain with radiation to left arm/jaw, found to have IWSTEMI, transferred to [facility] for emergent cath. 100% occlusion of RCA with 1 SATISH, IABP placement for perfusion, now s/p staged PCI of Lcx w/ SATISH x1 and IABP removal.

## 2024-04-03 NOTE — DISCHARGE NOTE PROVIDER - CARE PROVIDER_API CALL
Nena Cohn  Interventional Cardiology  [address] [place] [address], [address] [place] [id number]  Phone: [phone]  Fax: [phone]  Follow Up Time:

## 2024-04-03 NOTE — DISCHARGE NOTE PROVIDER - NSDCMRMEDTOKEN_GEN_ALL_CORE_FT
aspirin 81 mg oral capsule: 1 cap(s) orally once a day  atorvastatin 80 mg oral tablet: 1 tab(s) orally once a day (at bedtime)  carvedilol 12.5 mg oral tablet: 1 tab(s) orally every 12 hours  clopidogrel 75 mg oral tablet: 1 tab(s) orally once a day  Farxiga 5 mg oral tablet: 1 tab(s) orally once a day  insulin glargine 100 units/mL subcutaneous solution: 30 unit(s) subcutaneous once (at bedtime)  insulin lispro 100 units/mL injectable solution: 4 unit(s) injectable 3 times a day (before meals)  metFORMIN 1000 mg oral tablet: 1 tab(s) orally once a day   aspirin 81 mg oral capsule: 1 cap(s) orally once a day  atorvastatin 80 mg oral tablet: 1 tab(s) orally once a day (at bedtime)  carvedilol 12.5 mg oral tablet: 1 tab(s) orally every 12 hours  clopidogrel 75 mg oral tablet: 1 tab(s) orally once a day  Farxiga 5 mg oral tablet: 1 tab(s) orally once a day  insulin glargine 100 units/mL subcutaneous solution: 30 unit(s) subcutaneous once (at bedtime)  insulin lispro 100 units/mL injectable solution: 4 unit(s) injectable 3 times a day (before meals)  metFORMIN 1000 mg oral tablet: 1 tab(s) orally once a day  ondansetron 4 mg oral tablet: 1 tab(s) orally 3 times a day

## 2024-04-03 NOTE — PROGRESS NOTE ADULT - ASSESSMENT
HPI:  54 y/o female with pmhx of DM2 on Insulin and Metformin. She reports initially feeling chest pressures x 3 days ago her nephew call 911. pt then was taken to [facility], found to be having a stemi, was transferred to [facility] for emergent cath.       4/1  s/p PCI with SATISH to occluded culprit pRCA, LVEDP 21, LVEF 40%, posterior-basal hypokinesis, s/p IABP placement   - 4/2 s/p staged procedure: successful PCI to the LCX vi RRA   - Groin and right wrist cath site, both stable.   - IABP no longer present  - Continue DAPT (aspirin 81mg and clopidogrel 75mg) x 12 months   - Continue atorvastatin  4/1 ECHO with EF 62% , no significant valvular disease ( see full report above)   - euvolemic on exam   - Cont Coreg       - Reviewed and reinforced with patient:  wound care instructions, activities dos and dont's, medication compliance specifically antiplatelet therapy given stent/s.    - Patient aware to take DAPT  as prescribed and DO NOT STOP taking without consulting cardiologist first or STENT/s WILL CLOSE  - Reviewed and reinforced with patient:  site complications ( eg: bleeding, excruciating pain at the procedural site, large swelling ball size-  extremity numbness, tingling, temperature change), or CHEST PAIN; pt aware that if any of those occur he/she must call cardiologist IMMEDIATELY or 911 or go to nearest emergency room   - Reviewed and reinforced a heart healthy diet, Smoking Cessation  - Avoid using NSAIDs  (Aleve, Motrin, ibuprofen, naproxen) while on DAPT, please utilize Tylenol for pain control (not to exceed 4gm in 24 hours)  - Patient verbalizes understanding of ALL OF THE ABOVE, and gives positive feedback   -please make sure DAPT is prescribed to pt's preferred pharmacy on dc   -f/u appt in 2 weeks post dc with outpt cardiologist  -Keep Mg >2 K>4   - cont to monitor on tele  - all other care as per primary CICU team    Kaylan Patel NP  invasive cardiology     Please check [email or web address] password [email or web address] for cardiology service schedule and contact information via TEAMS

## 2024-04-03 NOTE — DIETITIAN INITIAL EVALUATION ADULT - NS FNS DIET ORDER
Diet, Consistent Carbohydrate/No Snacks:   DASH/TLC {Sodium & Cholesterol Restricted} (DASH) (04-01-24 @ 09:58) [Active]

## 2024-04-03 NOTE — DISCHARGE NOTE PROVIDER - NSDCFUADDAPPT_GEN_ALL_CORE_FT
No heavy lifting, strenuous activity, bending, straining, or unnecessary stair climbing for 2 weeks. No driving for 2 days. You may shower 24 hours following the procedure but avoid baths/swimming for 1 week. Check your groin site and wrist site for bleeding and/or swelling daily following procedure and call your doctor immediately if it occurs or if you experience increased pain at the site. Follow up with your cardiologist in 1-2 weeks. You may call [facility]  if you have any questions/concerns regarding your procedure [phone].  No heavy lifting, strenuous activity, bending, straining, or unnecessary stair climbing for 2 weeks. No driving for 2 days. You may shower 24 hours following the procedure but avoid baths/swimming for 1 week. Check your groin site and wrist site for bleeding and/or swelling daily following procedure and call your doctor immediately if it occurs or if you experience increased pain at the site. Follow up with your cardiologist in 1-2 weeks. You may call [facility]  if you have any questions/concerns regarding your procedure [phone].     APPTS ARE READY TO BE MADE: [x] YES    Best Family or Patient Contact (if needed):    Additional Information about above appointments (if needed):    1: Dr. Nena Cohn  2:   3:     Other comments or requests:    No heavy lifting, strenuous activity, bending, straining, or unnecessary stair climbing for 2 weeks. No driving for 2 days. You may shower 24 hours following the procedure but avoid baths/swimming for 1 week. Check your groin site and wrist site for bleeding and/or swelling daily following procedure and call your doctor immediately if it occurs or if you experience increased pain at the site. Follow up with your cardiologist in 1-2 weeks. You may call [facility]  if you have any questions/concerns regarding your procedure [phone].     APPTS ARE READY TO BE MADE: [x] YES    Best Family or Patient Contact (if needed):    Additional Information about above appointments (if needed):    1: Dr. Nena Cohn  2:   3:     Other comments or requests:     A [facility] providers office was contacted to secure an appointment, however the office will follow up with the patient/caregiver directly.

## 2024-04-03 NOTE — DISCHARGE NOTE PROVIDER - NSDCCPTREATMENT_GEN_ALL_CORE_FT
PRINCIPAL PROCEDURE  Procedure: Left heart cardiac cath  Findings and Treatment: 4/1 - LHC: SATISH-RCA  4/2 - s/p staged PCI to LCx

## 2024-04-03 NOTE — DISCHARGE NOTE PROVIDER - NSDCCPCAREPLAN_GEN_ALL_CORE_FT
PRINCIPAL DISCHARGE DIAGNOSIS  Diagnosis: STEMI (ST elevation myocardial infarction)  Assessment and Plan of Treatment: Goal: You will remain chest pain free  Continue with a low salt, low fat diet.  Continue weight management.  Take medications as prescribed.  No smoking.  Follow up appointments with your doctor(s) as instruced.  No heavy lifting, strenuous activity, bending, straining, or unnecessary stair climbing for 2 weeks. No driving for 2 days. You may shower 24 hours following the procedure but avoid baths/swimming for 1 week. Check your groin and wrist sfor bleeding and/or swelling daily following procedure and call your doctor immediately if it occurs or if you experience increased pain at the site. Follow up with your cardiologist in 1-2 weeks. You may call [facility]  if you have any questions/concerns regarding your procedure [phone].      SECONDARY DISCHARGE DIAGNOSES  Diagnosis: Diabetes  Assessment and Plan of Treatment: Goal: Your hemoglobin A1C will be between 7-8   Continue to follow with your primary care MD or your endocrinologist.  Follow a heart healthy diabetic diet. If you check your fingerstick glucose at home, call your MD if it is greater than 250mg/dL on 2 occasions or less than 100mg/dL on 2 occasions. Know signs of low blood sugar, such as: dizziness, shakiness, sweating, confusion, hunger, nervousness-drink 4 ounces apple juice if occurs and call your doctor. Know early signs of high blood sugar, such as: frequent urination, increased thirst, blurry vision, fatigue, headache - call your doctor if this occurs. Follow with other practitioners to care for your diabetes, such as ophthamologist and podiatrist.    Diagnosis: HLD (hyperlipidemia)  Assessment and Plan of Treatment: Goal: Your LDL cholesterol will be less than 70mg/dL  Continue with your cholesterol medications. Eat a heart healthy diet that is low in saturated fats and salt, and includes whole grains, fruits, vegetables and lean protein; exercise regularly (consult with your physician or cardiologist first); maintain a heart healthy weight; if you smoke - quit (A resource to help you stop smoking is the [facility] – phone number 732-059-1939.). Continue to follow with your primary physician or cardiologist.

## 2024-04-03 NOTE — PROGRESS NOTE ADULT - NSPROGADDITIONALINFOA_GEN_ALL_CORE
52yo woman who presented with acute RCA STEMI s/p PCI with good result, requiring IABP for HD instability during procedure. Also residual LCx disease going for staged PCI without issue on 4/2. LVEF preserved.    Neuro no issues  CV: RCA stemi with multivessel disease, staged intervetion to LCx 4/2. Cont ASA, Plavix, statin and cont coreg 12.5mg. Wanted to add ACEi/ARB but BP too low currently. IABP out. Increasing hydralazine.  Pulm on RA  GI DASH diet  Renal Cr wnl, LVEDP of 21 and negative 1L without issue  ID no infection  Heme heparin for IABP  Endo BG controlled A1c of 6.6% on ISS. Will check SGLT2i insurance coverage  Lines no central access

## 2024-04-03 NOTE — DISCHARGE NOTE PROVIDER - HOSPITAL COURSE
HPI: 53F Hx T2DM on Insulin and Metformin. She reports initially feeling chest pressures x 3 days ago which she attributed to being stressed, was then relieved with rest and water. She then began having severe left sided chest pain this morning radiating to her left arm and jaw, associated with nausea and vomiting. She initially tried to rest but felt she was having a "heart attack" to which she had her nephew call 911. pt then was taken to [facility], found to be having a stemi, was transferred to [facility] for emergent cath. She was aspirin and plavix loaded. Pt was then found to have complete occlusion of RCA as culprit vessel, received 1 SATISH, had IABP for perfusion. Was then transferred to CICU for further management with possible staged Lcx    CICU Course: s/p LHC (4/1) 100% occlusion of RCA as culprit vessel s/p SATISH x 1.  TTE 4/1: EF 62%.  IABP placed for perfusion, started on PO afterload and beta blocker.  s/p staged PCI to LCx.  IABP removed.  Hospital course uneventful.  Remains on DAPT, high intensity statin and Coreg.  Discharge to follow up outpatient.

## 2024-04-03 NOTE — DIETITIAN INITIAL EVALUATION ADULT - PERSON TAUGHT/METHOD
Provided education on Carbohydrate Consistent diet including sources of carbohydrates, portion sizes, pairing protein with carbohydrates, limiting sugar sweetened beverages in diet and the importance of consistent eating pattern to help optimize glycemic control. Also discussed importance of limiting sodium intake as well as heart healthy food options. Pt made aware RD to remain available.  Provided pt with the following packets:  1) Heart Healthy Consistent Carbohydrate Nutrition Therapy  2) Diabetes My Plate Guideline/verbal instruction/written material/patient instructed/daughter instructed

## 2024-04-03 NOTE — DISCHARGE NOTE NURSING/CASE MANAGEMENT/SOCIAL WORK - PATIENT PORTAL LINK FT
You can access the FollowMyHealth Patient Portal offered by [facility] by registering at the following website: [email or web address]. By joining [employer]’s FollowMyHealth portal, you will also be able to view your health information using other applications (apps) compatible with our system.

## 2024-04-03 NOTE — DIETITIAN INITIAL EVALUATION ADULT - NSPROEDAREADYLEARN_GEN_A_NUR
N ALLERGY VISIT    • How old were you when you had the reaction? 35-40 years old.  she is unsure of the exact timing.  • Did the reaction occur more than 1 time? yes  • What did the rash look like (red,little bumps,big welts,etc)? Red welts.  The first time it was on her back.  The next time the welts were on the back again but she she got to take a look at them.  • Did the rash itch? yes  • Any respiratory issues (cough/wheeze) during reaction? no  • Any fever during reaction? no  • Any joint pain or swelling during reaction? no  • Have you ever reacted to a medication in the cephalosporin family that you are aware of? (Cephalexin/Keflex, Cefdinir/Omnicef, Rocephin) no.    1. Do you take any antihistamines (eye drops/oral medication/nasal sprays)?  If yes, please stop this 7 days prior to testing.  2. If pregnant are you taking Unisom?  If yes, please stop 7 days prior to testing.  3. Do you take any beta blockers? Yes, carvedilol 25 mg twice daily.  if yes, what is the dose and why do you take this?  Hypertension.       none

## 2024-04-03 NOTE — DIETITIAN INITIAL EVALUATION ADULT - ORAL INTAKE PTA/DIET HISTORY
visited pt at bedside this morning. reports good PO intake PTA. confirms PMHx of DM2, monitor blood glucose levels daily. on insulin and metformin. monitors CHO and sugar intake. no known allergies noted in chart.

## 2024-04-03 NOTE — PROGRESS NOTE ADULT - SUBJECTIVE AND OBJECTIVE BOX
Interventional Cardiology Post Cath Progress Note  CARDIAC CATH LAB, ACP TEAM   [phone]      CHIEF COMPLAINT: Patient is a 53y old  Female who presents with a chief complaint of STEMI (03 Apr 2024 07:47)      HPI:  54 y/o female with pmhx of DM2 on Insulin and Metformin. She reports initially feeling chest pressures x 3 days ago which she attributed to being stressed, was then relieved with rest and water. She then began having severe left sided chest pain this morning radiating to her left arm and jaw, associated with nausea and vomiting. She initially tried to rest but felt she was having a "heart attack" to which she had her nephew call 911. pt then was taken to [facility], found to be having a stemi, was transferred to [facility] for emergent cath. She was aspirin and plavix loaded. Pt was then found to have complete occlusion of RCA as culprit vessel, received 1 SATISH, had IABP for perfusion. Was then transferred to CICU for further management with possible staged lcx.  (01 Apr 2024 13:35)        Subjective/Observations: patient seen and examined.  denies chest pain, dyspena, dizziness, palpitations, N&V, HA      Review of Systems all WNL except below indicated:    Constitutional: [ ] Fever [ ] Chills [ ] Fatigue [ ] Weight change   HEENT: [ ] Blurred vision [ ] Eye Pain [ ] Headache [ ] Runny nose [ ] Sore Throat   Respiratory: [ ] Cough [ ] Wheezing [ ] Shortness of breath  Cardiovascular: [ ] Chest Pain [ ] Palpitations [ ] RUBY [ ] PND [ ] Orthopnea  Gastrointestinal: [ ] Abdominal Pain [ ] Diarrhea [ ] Constipation [ ] Hemorrhoids [ ] Nausea [ ] Vomiting  Genitourinary: [ ] Nocturia [ ] Dysuria [ ] Incontinence  Extremities: [ ] Swelling [ ] Joint Pain  Neurologic: [ ] Focal deficit [ ] Paresthesias [ ] Syncope  Lymphatic: [ ] Swelling [ ] Lymphadenopathy   Skin: [ ] Rash [ ] Ecchymoses [ ] Wounds [ ] Lesions  Psychiatry: [ ] Depression [ ] Suicidal/Homicidal Ideation [ ] Anxiety [ ] Sleep Disturbances  [x ] 10 point review of systems is otherwise negative except as mentioned above            [ ]Unable to obtain    Objective:  Vital Signs Last 24 Hrs  T(C): 36.7 (03 Apr 2024 07:00), Max: 37.4 (03 Apr 2024 03:00)  T(F): 98.1 (03 Apr 2024 07:00), Max: 99.3 (03 Apr 2024 03:00)  HR: 82 (03 Apr 2024 08:00) (74 - 107)  BP: 103/60 (03 Apr 2024 08:00) (95/57 - 138/79)  BP(mean): 76 (03 Apr 2024 08:00) (63 - 103)  RR: 17 (03 Apr 2024 08:00) (12 - 28)  SpO2: 95% (03 Apr 2024 08:00) (93% - 98%)    Parameters below as of 03 Apr 2024 07:00  Patient On (Oxygen Delivery Method): room air        04-02-24 @ 07:01  -  04-03-24 @ 07:00  --------------------------------------------------------  IN: 304 mL / OUT: 1675 mL / NET: -1371 mL        PAST MEDICAL & SURGICAL HISTORY:      SOCIAL HISTORY:  No tobacco, ethanol, or drug abuse.    FAMILY HISTORY:    No family history of acute MI or sudden cardiac death.    MEDICATIONS  (STANDING):  aspirin enteric coated 81 milliGRAM(s) Oral daily  atorvastatin 80 milliGRAM(s) Oral at bedtime  carvedilol 12.5 milliGRAM(s) Oral every 12 hours  clopidogrel Tablet 75 milliGRAM(s) Oral daily  heparin   Injectable 5000 Unit(s) SubCutaneous every 8 hours  insulin lispro (ADMELOG) corrective regimen sliding scale   SubCutaneous at bedtime  insulin lispro (ADMELOG) corrective regimen sliding scale   SubCutaneous three times a day before meals    MEDICATIONS  (PRN):      Allergies    No Known Allergies    Intolerances                                12.2   8.10  )-----------( 213      ( 03 Apr 2024 00:30 )             36.9     04-03    139  |  103  |  12  ----------------------------<  183<H>  3.7   |  20<L>  |  0.64    Ca    9.2      03 Apr 2024 00:30  Phos  3.3     04-03  Mg     2.3     04-03    TPro  7.6  /  Alb  4.2  /  TBili  0.8  /  DBili  x   /  AST  52<H>  /  ALT  32  /  AlkPhos  85  04-03    PT/INR - ( 01 Apr 2024 10:21 )   PT: 12.0 sec;   INR: 1.09 ratio         PTT - ( 02 Apr 2024 12:09 )  PTT:76.6 sec  Urinalysis Basic - ( 03 Apr 2024 00:30 )    Color: x / Appearance: x / SG: x / pH: x  Gluc: 183 mg/dL / Ketone: x  / Bili: x / Urobili: x   Blood: x / Protein: x / Nitrite: x   Leuk Esterase: x / RBC: x / WBC x   Sq Epi: x / Non Sq Epi: x / Bacteria: x      CARDIAC MARKERS ( 01 Apr 2024 10:21 )  x     / x     / 351 U/L / x     / 26.9 ng/mL          Physical Exam:  No apparent distress, alert and oriented times three, appropriate affect  Clear to auscultation with no wheezing, ronchi or crackles  Regular rate and rhythm with no murmur, rub or gallop      right  groin w/o bleeding or hematoma.  soft, non tender.  Pulses in the right lower extremity are palpable.   Denies chest pain, denies groin/leg/foot: pain, numbness or tingling     Right wrist stable w/o bleeding or hematoma; site soft, non tender.  Right radial pulse palpable +2.  Denies chest pain, denies right wrist/arm/hand:  pain, numbness, or tingling             < from: TTE W or WO Ultrasound Enhancing Agent (04.01.24 @ 11:04) >     CONCLUSIONS:      1. Left ventricular cavity is normal in size. Left ventricular wall thickness is normal. Left ventricular systolic function is normal with an ejection fraction of 62 % by Decker's method of disks. There are no regional wall motion abnormalities seen.   2. Normal left ventricular diastolic function, with normal filling pressure.   3. Normal right ventricular cavity size, with normal wall thickness, and normal systolic function.   4. Normal left andright atrial size.   5. No significant valvular disease.   6. No pericardial effusion seen.   7. No prior echocardiogram is available for comparison.    < end of copied text >    < from: 12 Lead ECG (04.01.24 @ 09:52) >    Ventricular Rate 105 BPM    Atrial Rate 105 BPM    P-R Interval 190 ms    QRS Duration 78 ms    Q-T Interval 356 ms    QTC Calculation(Bazett) 470 ms    P Axis 53 degrees    R Axis 76 degrees    T Axis 83 degrees    Diagnosis Line SINUS TACHYCARDIA    < end of copied text >      < from: Cardiac Catheterization (04.01.24 @ 08:50) >  Conclusions:   1. Occluded culprit pRCA s/p PCI with SATISH   2. Severe atherosclerosis of the mLCx   3. Moderate atherosclerosis of the mLAD   4. LVEDP 21, LVEF 40%, posterior-basal hypokinesis   5. IABP placement   Recommendations:     Continue DAPT for at least 1 year and return for staged PCI of the  mLCx.  IABP was placed for ongoing CP, hypotension, BELEM.       < end of copied text >      < from: Cardiac Catheterization (04.02.24 @ 16:05) >    Conclusions:   Successful PCI to the LCX.     DAPT for 12 months in setting of recent ACS.   Aggressive medical managent and risk modification.       < end of copied text >  < from: Cardiac Catheterization (04.02.24 @ 16:05) >  Diagnostic Findings:     Coronary Angiography   CX   Mid circumflex: There is a 99 % stenosis.      < end of copied text >

## 2024-04-03 NOTE — PROGRESS NOTE ADULT - SUBJECTIVE AND OBJECTIVE BOX
PATIENT:  LOUIS DESAI  [id number]    CHIEF COMPLAINT:  Patient is a 53y old  Female who presents with a chief complaint of STEMI (2024 05:50)      INTERVAL HISTORYOVERNIGHT EVENTS:      REVIEW OF SYSTEMS:    Constitutional:     [ ] negative [ ] fevers [ ] chills [ ] weight loss [ ] weight gain  HEENT:                  [ ] negative [ ] dry eyes [ ] eye irritation [ ] postnasal drip [ ] nasal congestion  CV:                         [ ] negative  [ ] chest pain [ ] orthopnea [ ] palpitations [ ] murmur  Resp:                     [ ] negative [ ] cough [ ] shortness of breath [ ] dyspnea [ ] wheezing [ ] sputum [ ] hemoptysis  GI:                          [ ] negative [ ] nausea [ ] vomiting [ ] diarrhea [ ] constipation [ ] abd pain [ ] dysphagia   :                        [ ] negative [ ] dysuria [ ] nocturia [ ] hematuria [ ] increased urinary frequency  Musculoskeletal: [ ] negative [ ] back pain [ ] myalgias [ ] arthralgias [ ] fracture  Skin:                       [ ] negative [ ] rash [ ] itch  Neurological:        [ ] negative [ ] headache [ ] dizziness [ ] syncope [ ] weakness [ ] numbness  Psychiatric:           [ ] negative [ ] anxiety [ ] depression  Endocrine:            [ ] negative [ ] diabetes [ ] thyroid problem  Heme/Lymph:      [ ] negative [ ] anemia [ ] bleeding problem  Allergic/Immune: [ ] negative [ ] itchy eyes [ ] nasal discharge [ ] hives [ ] angioedema    [ ] All other systems negative  [ ] Unable to assess ROS because ________.    MEDICATIONS:  MEDICATIONS  (STANDING):  aspirin enteric coated 81 milliGRAM(s) Oral daily  atorvastatin 80 milliGRAM(s) Oral at bedtime  carvedilol 12.5 milliGRAM(s) Oral every 12 hours  clopidogrel Tablet 75 milliGRAM(s) Oral daily  heparin   Injectable 5000 Unit(s) SubCutaneous every 8 hours  insulin lispro (ADMELOG) corrective regimen sliding scale   SubCutaneous at bedtime  insulin lispro (ADMELOG) corrective regimen sliding scale   SubCutaneous three times a day before meals    MEDICATIONS  (PRN):      ALLERGIES:  Allergies    No Known Allergies    Intolerances        OBJECTIVE:  ICU Vital Signs Last 24 Hrs  T(C): 36.7 (2024 07:00), Max: 37.4 (2024 03:00)  T(F): 98.1 (2024 07:00), Max: 99.3 (2024 03:00)  HR: 85 (2024 07:00) (74 - 107)  BP: 106/63 (2024 07:00) (95/57 - 138/79)  BP(mean): 79 (2024 07:00) (63 - 103)  ABP: --  ABP(mean): --  RR: 17 (2024 07:00) (12 - 28)  SpO2: 95% (2024 07:00) (93% - 98%)    O2 Parameters below as of 2024 07:00  Patient On (Oxygen Delivery Method): room air            Adult Advanced Hemodynamics Last 24 Hrs  CVP(mm Hg): --  CVP(cm H2O): --  CO: --  CI: --  PA: --  PA(mean): --  PCWP: --  SVR: --  SVRI: --  PVR: --  PVRI: --  CAPILLARY BLOOD GLUCOSE      POCT Blood Glucose.: 219 mg/dL (2024 07:18)  POCT Blood Glucose.: 178 mg/dL (2024 22:04)  POCT Blood Glucose.: 140 mg/dL (2024 17:29)  POCT Blood Glucose.: 161 mg/dL (2024 11:58)  POCT Blood Glucose.: 144 mg/dL (2024 08:25)    CAPILLARY BLOOD GLUCOSE      POCT Blood Glucose.: 219 mg/dL (2024 07:18)    I&O's Summary    2024 07:01  -  2024 07:00  --------------------------------------------------------  IN: 304 mL / OUT: 1675 mL / NET: -1371 mL      Daily     Daily Weight in k.2 (2024 06:00)    PHYSICAL EXAMINATION:  General: WN/WD NAD  HEENT: PERRLA, EOMI, moist mucous membranes  Neurology: A&Ox3, nonfocal, HADDAD x 4  Respiratory: CTA B/L, normal respiratory effort, no wheezes, crackles, rales  CV: RRR, S1S2, no murmurs, rubs or gallops  Abdominal: Soft, NT, ND +BS, Last BM  Extremities: No edema, + peripheral pulses  Incisions:   Tubes:    LABS:                          12.2   8.10  )-----------( 213      ( 2024 00:30 )             36.9     04-03    139  |  103  |  12  ----------------------------<  183<H>  3.7   |  20<L>  |  0.64    Ca    9.2      2024 00:30  Phos  3.3     04-03  Mg     2.3     04-03    TPro  7.6  /  Alb  4.2  /  TBili  0.8  /  DBili  x   /  AST  52<H>  /  ALT  32  /  AlkPhos  85  04-03    LIVER FUNCTIONS - ( 2024 00:30 )  Alb: 4.2 g/dL / Pro: 7.6 g/dL / ALK PHOS: 85 U/L / ALT: 32 U/L / AST: 52 U/L / GGT: x           PT/INR - ( 2024 10:21 )   PT: 12.0 sec;   INR: 1.09 ratio         PTT - ( 2024 12:09 )  PTT:76.6 sec    CARDIAC MARKERS ( 2024 10:21 )  x     / x     / 351 U/L / x     / 26.9 ng/mL      Urinalysis Basic - ( 2024 00:30 )    Color: x / Appearance: x / SG: x / pH: x  Gluc: 183 mg/dL / Ketone: x  / Bili: x / Urobili: x   Blood: x / Protein: x / Nitrite: x   Leuk Esterase: x / RBC: x / WBC x   Sq Epi: x / Non Sq Epi: x / Bacteria: x      Assessment and Plan:   	  Assessment	  52 y/o female with pmhx of DM2 (on insulin and metformin) presented to OSH with worsening left sided chest pain with radiation to left arm/jaw, found to have IWSTEMI, transferred to [facility] for emergent cath. 100% occlusion of RCA with 1 SATISH, IABP placement for perfusion, now s/p staged PCI of Lcx w/ SATISH x1 and IABP removal.    NEURO:  - A&O x 3, no focal deficits   - Continue to monitor mental status per ICU protocol     PULM:  - saturating well on room air  - Continue to monitor resp status, maintain >94%     CV:  STEMI   -  % occlusion of RCA as culprit vessel; s/p 1 SATISH   - TTE : EF 62%  -  staged PCI SATISH x1 to mLcx  - IABP removed  - euvolemic on exam  - DAPT: asa/plavix, high intensity statin  - GDMT: Coreg 12.5  - continue strict I&O, daily weights, perfusion indices monitoring    RENAL:  - sCr within normal limits  - continue strict I&O, electrolyte monitoring  - trend daily, Keep Mg>2, K>4    GI:  - continue dash/diabetic diet  - monitor for BM    ENDO:  #DM2: HbA1c 6.6 on admission   - Insulin and metformin at home  - ISS while inpatient, FS ac/hs    - lipid panel noted, continue lipitor  - TSH within normal limits    HEMATOLOGIC:  - H/H and platelets stable   - trend daily    #DVT ppx: heparin SQ q8h     ID:  # Leukocytosis   - likely reactive in setting of stemi   - no s/sx of infection  - Continue to trend WBC and fever curve

## 2024-04-03 NOTE — DIETITIAN INITIAL EVALUATION ADULT - NS FNS WEIGHT CHANGE REASON
reports  pounds in August  11.7 pound / 8.2% loss x ~8 months.   Unsure as to why weight was lost. no changes in appetite.   RD will continue to monitor trends./unintentional

## 2024-04-03 NOTE — DIETITIAN INITIAL EVALUATION ADULT - PERTINENT LABORATORY DATA
04-03    139  |  103  |  12  ----------------------------<  183<H>  3.7   |  20<L>  |  0.64    Ca    9.2      03 Apr 2024 00:30  Phos  3.3     04-03  Mg     2.3     04-03    TPro  7.6  /  Alb  4.2  /  TBili  0.8  /  DBili  x   /  AST  52<H>  /  ALT  32  /  AlkPhos  85  04-03  POCT Blood Glucose.: 219 mg/dL (04-03-24 @ 07:18)  A1C with Estimated Average Glucose Result: 6.6 % (04-01-24 @ 10:21)

## 2024-04-04 ENCOUNTER — TRANSCRIPTION ENCOUNTER (OUTPATIENT)
Age: 54
End: 2024-04-04

## 2024-04-05 ENCOUNTER — EMERGENCY (EMERGENCY)
Facility: HOSPITAL | Age: 54
LOS: 1 days | Discharge: HOME | End: 2024-04-05
Attending: EMERGENCY MEDICINE
Payer: COMMERCIAL

## 2024-04-05 VITALS
DIASTOLIC BLOOD PRESSURE: 82 MMHG | RESPIRATION RATE: 16 BRPM | HEART RATE: 76 BPM | OXYGEN SATURATION: 98 % | SYSTOLIC BLOOD PRESSURE: 112 MMHG

## 2024-04-05 VITALS
RESPIRATION RATE: 20 BRPM | SYSTOLIC BLOOD PRESSURE: 130 MMHG | HEIGHT: 63 IN | DIASTOLIC BLOOD PRESSURE: 84 MMHG | OXYGEN SATURATION: 96 % | TEMPERATURE: 98 F | HEART RATE: 98 BPM | WEIGHT: 145.06 LBS

## 2024-04-05 LAB
ALBUMIN SERPL ELPH-MCNC: 4.3 G/DL — SIGNIFICANT CHANGE UP (ref 3.3–5)
ALP SERPL-CCNC: 92 U/L — SIGNIFICANT CHANGE UP (ref 40–120)
ALT FLD-CCNC: 31 U/L — SIGNIFICANT CHANGE UP (ref 10–45)
ANION GAP SERPL CALC-SCNC: 16 MMOL/L — SIGNIFICANT CHANGE UP (ref 5–17)
AST SERPL-CCNC: 28 U/L — SIGNIFICANT CHANGE UP (ref 10–40)
BASOPHILS # BLD AUTO: 0.02 K/UL — SIGNIFICANT CHANGE UP (ref 0–0.2)
BASOPHILS NFR BLD AUTO: 0.2 % — SIGNIFICANT CHANGE UP (ref 0–2)
BILIRUB SERPL-MCNC: 0.7 MG/DL — SIGNIFICANT CHANGE UP (ref 0.2–1.2)
BUN SERPL-MCNC: 18 MG/DL — SIGNIFICANT CHANGE UP (ref 7–23)
CALCIUM SERPL-MCNC: 9.7 MG/DL — SIGNIFICANT CHANGE UP (ref 8.4–10.5)
CHLORIDE SERPL-SCNC: 100 MMOL/L — SIGNIFICANT CHANGE UP (ref 96–108)
CK MB CFR SERPL CALC: 2.2 NG/ML — SIGNIFICANT CHANGE UP (ref 0–3.8)
CO2 SERPL-SCNC: 20 MMOL/L — LOW (ref 22–31)
CREAT SERPL-MCNC: 0.66 MG/DL — SIGNIFICANT CHANGE UP (ref 0.5–1.3)
EGFR: 105 ML/MIN/1.73M2 — SIGNIFICANT CHANGE UP
EOSINOPHIL # BLD AUTO: 0.11 K/UL — SIGNIFICANT CHANGE UP (ref 0–0.5)
EOSINOPHIL NFR BLD AUTO: 1.2 % — SIGNIFICANT CHANGE UP (ref 0–6)
GLUCOSE SERPL-MCNC: 166 MG/DL — HIGH (ref 70–99)
HCT VFR BLD CALC: 38.6 % — SIGNIFICANT CHANGE UP (ref 34.5–45)
HGB BLD-MCNC: 13 G/DL — SIGNIFICANT CHANGE UP (ref 11.5–15.5)
IMM GRANULOCYTES NFR BLD AUTO: 0.3 % — SIGNIFICANT CHANGE UP (ref 0–0.9)
LIDOCAIN IGE QN: 28 U/L — SIGNIFICANT CHANGE UP (ref 7–60)
LYMPHOCYTES # BLD AUTO: 1.84 K/UL — SIGNIFICANT CHANGE UP (ref 1–3.3)
LYMPHOCYTES # BLD AUTO: 20.6 % — SIGNIFICANT CHANGE UP (ref 13–44)
MCHC RBC-ENTMCNC: 29.3 PG — SIGNIFICANT CHANGE UP (ref 27–34)
MCHC RBC-ENTMCNC: 33.7 GM/DL — SIGNIFICANT CHANGE UP (ref 32–36)
MCV RBC AUTO: 86.9 FL — SIGNIFICANT CHANGE UP (ref 80–100)
MONOCYTES # BLD AUTO: 0.63 K/UL — SIGNIFICANT CHANGE UP (ref 0–0.9)
MONOCYTES NFR BLD AUTO: 7.1 % — SIGNIFICANT CHANGE UP (ref 2–14)
NEUTROPHILS # BLD AUTO: 6.3 K/UL — SIGNIFICANT CHANGE UP (ref 1.8–7.4)
NEUTROPHILS NFR BLD AUTO: 70.6 % — SIGNIFICANT CHANGE UP (ref 43–77)
NRBC # BLD: 0 /100 WBCS — SIGNIFICANT CHANGE UP (ref 0–0)
PLATELET # BLD AUTO: 220 K/UL — SIGNIFICANT CHANGE UP (ref 150–400)
POTASSIUM SERPL-MCNC: 3.9 MMOL/L — SIGNIFICANT CHANGE UP (ref 3.5–5.3)
POTASSIUM SERPL-SCNC: 3.9 MMOL/L — SIGNIFICANT CHANGE UP (ref 3.5–5.3)
PROT SERPL-MCNC: 8.2 G/DL — SIGNIFICANT CHANGE UP (ref 6–8.3)
RBC # BLD: 4.44 M/UL — SIGNIFICANT CHANGE UP (ref 3.8–5.2)
RBC # FLD: 11.9 % — SIGNIFICANT CHANGE UP (ref 10.3–14.5)
SODIUM SERPL-SCNC: 136 MMOL/L — SIGNIFICANT CHANGE UP (ref 135–145)
TROPONIN T, HIGH SENSITIVITY RESULT: 639 NG/L — HIGH (ref 0–51)
TROPONIN T, HIGH SENSITIVITY RESULT: 696 NG/L — HIGH (ref 0–51)
WBC # BLD: 8.93 K/UL — SIGNIFICANT CHANGE UP (ref 3.8–10.5)
WBC # FLD AUTO: 8.93 K/UL — SIGNIFICANT CHANGE UP (ref 3.8–10.5)

## 2024-04-05 PROCEDURE — 85018 HEMOGLOBIN: CPT

## 2024-04-05 PROCEDURE — 71045 X-RAY EXAM CHEST 1 VIEW: CPT | Mod: 26

## 2024-04-05 PROCEDURE — 96374 THER/PROPH/DIAG INJ IV PUSH: CPT

## 2024-04-05 PROCEDURE — 82550 ASSAY OF CK (CPK): CPT

## 2024-04-05 PROCEDURE — 84484 ASSAY OF TROPONIN QUANT: CPT

## 2024-04-05 PROCEDURE — 85014 HEMATOCRIT: CPT

## 2024-04-05 PROCEDURE — 84295 ASSAY OF SERUM SODIUM: CPT

## 2024-04-05 PROCEDURE — 82330 ASSAY OF CALCIUM: CPT

## 2024-04-05 PROCEDURE — 83690 ASSAY OF LIPASE: CPT

## 2024-04-05 PROCEDURE — 82962 GLUCOSE BLOOD TEST: CPT

## 2024-04-05 PROCEDURE — 93005 ELECTROCARDIOGRAM TRACING: CPT

## 2024-04-05 PROCEDURE — 83605 ASSAY OF LACTIC ACID: CPT

## 2024-04-05 PROCEDURE — 82803 BLOOD GASES ANY COMBINATION: CPT

## 2024-04-05 PROCEDURE — 82947 ASSAY GLUCOSE BLOOD QUANT: CPT

## 2024-04-05 PROCEDURE — 85025 COMPLETE CBC W/AUTO DIFF WBC: CPT

## 2024-04-05 PROCEDURE — 82435 ASSAY OF BLOOD CHLORIDE: CPT

## 2024-04-05 PROCEDURE — 84132 ASSAY OF SERUM POTASSIUM: CPT

## 2024-04-05 PROCEDURE — 99285 EMERGENCY DEPT VISIT HI MDM: CPT

## 2024-04-05 PROCEDURE — 99285 EMERGENCY DEPT VISIT HI MDM: CPT | Mod: 25

## 2024-04-05 PROCEDURE — 82553 CREATINE MB FRACTION: CPT

## 2024-04-05 PROCEDURE — 71045 X-RAY EXAM CHEST 1 VIEW: CPT

## 2024-04-05 PROCEDURE — 80053 COMPREHEN METABOLIC PANEL: CPT

## 2024-04-05 PROCEDURE — 99285 EMERGENCY DEPT VISIT HI MDM: CPT | Mod: GC

## 2024-04-05 RX ORDER — ONDANSETRON 8 MG/1
1 TABLET, FILM COATED ORAL
Qty: 21 | Refills: 0
Start: 2024-04-05 | End: 2024-04-11

## 2024-04-05 RX ORDER — ONDANSETRON 8 MG/1
4 TABLET, FILM COATED ORAL ONCE
Refills: 0 | Status: COMPLETED | OUTPATIENT
Start: 2024-04-05 | End: 2024-04-05

## 2024-04-05 RX ORDER — ASPIRIN/CALCIUM CARB/MAGNESIUM 324 MG
324 TABLET ORAL ONCE
Refills: 0 | Status: COMPLETED | OUTPATIENT
Start: 2024-04-05 | End: 2024-04-05

## 2024-04-05 RX ADMIN — Medication 324 MILLIGRAM(S): at 01:54

## 2024-04-05 RX ADMIN — ONDANSETRON 4 MILLIGRAM(S): 8 TABLET, FILM COATED ORAL at 01:55

## 2024-04-05 NOTE — CHART NOTE - NSCHARTNOTEFT_GEN_A_CORE
Patient was outreached but did not answer. A voicemail was left for the patient to return our call [phone]

## 2024-04-05 NOTE — ED PROVIDER NOTE - ATTENDING CONTRIBUTION TO CARE
Patient with the recently placed cardiac stents presents with acute episode of nausea and vomiting shortly after eating.  On exam patient is well-appearing, soft nontender abdomen, no acute distress.  EKG shows no acute pathology and is consistent with prior.  Workup was done to rule out acute cardiac injury.  While troponin is elevated, this is in the setting of recent cardiac intervention and it is lower than it was when it was last checked.  Furthermore, CK-MB is completely negative today which is inconsistent with acute cardiac injury.  Patient was evaluated by cardiology who agrees that this does not appear to be cardiac in nature at this time.  The rest of her workup showed no acute pathology in her labs or on imaging.  Given benign abdominal exam, labs, vital signs, there is also very low suspicion for acute surgical abdominal pathology or severe abdominal infection.  Patient's symptoms resolved in the ED and is now tolerating p.o. Multiple diagnoses were considered during patient's evaluation today. While exact etiology of symptoms is unclear, there appears to be no emergent process today that would require further emergent or inpatient management. Pt is safe for dc with outpt f/u and return instructions if symptoms worsen.

## 2024-04-05 NOTE — CONSULT NOTE ADULT - SUBJECTIVE AND OBJECTIVE BOX
Cardiology Consult Note   [Please check [email or web address] password: "[email or web address]" for cardiology service schedule and contact information]    History of Present Illness:   Ms. Atwood is a 54 yo F w/ hx of T2DM and CAD w/ recent inferior STEMI s/p SATISH to RCA and LCx (residual 40% LAD) who presented to the ED w/ abd pain, nausea and emesis.    Pt was recently admitted on 4/1 after presenting w/ L sided chest pain found to have an inferior STEMI taken to the cath lab where she was found to have 100% RCA and 99% LCx. She received 1x SATISH to the RCA and was was admitted to the CICU and underwent staged PCI to the LCx the following day. TTE showed preserved EF 2% and she ultimately did well. She was dc'ed on ASA 81mg, atrova 80mg, clopidogrel 75mg, carvedilol 12.5mg BID. Her BPs were normal/borderline low so additional afterload agents were deferred.     Labs showed normal CBC, normal lytes, Cr 0.66, HS-trop 696 > 639 (was last 405 on 4/1 prior to PCI), neg CKMB x2, , and CXR was clear.    EKG showed NSR w/ normal axis, normal intervals, and biphasic T-waves in leads III and aVF, overall unchanged from her prior post-PCI.    HOME CARDIAC MEDS:      PMHx: reviewed, see below  SOCIAL HISTORY:  unchanged    MEDICATIONS:      REVIEW OF SYSTEMS:  CV: chest pain (-), palpitation (-), orthopnea (-), PND (-), edema (-)  PULM: SOB (-), cough (-), wheezing (-), hemoptysis (-).   CONST: fever (-), chills (-) or fatigue (-)  GI: abdominal distension (-), abdominal pain (-) , nausea/vomiting (-), hematemesis, (-), melena (-), hematochezia (-)  : dysuria (-), frequency (-), hematuria (-).   NEURO: numbness (-), weakness (-), dizziness (-)  SKIN: itching (-), rash (-)  HEENT:  visual changes (-); vertigo or throat pain (-);  neck stiffness (-)     All other review of systems is negative unless indicated above.   -------------------------------------------------------------------------------------------  VITALS:  T(C): 36.8 (04-05-24 @ 00:57), Max: 36.8 (04-05-24 @ 00:57)  HR: 98 (04-05-24 @ 00:57) (98 - 98)  BP: 130/84 (04-05-24 @ 00:57) (130/84 - 130/84)  RR: 20 (04-05-24 @ 00:57) (20 - 20)  SpO2: 96% (04-05-24 @ 00:57) (96% - 96%)  Wt(kg): --  I&O's Summary      PHYSICAL EXAM:  GEN: NAD, sitting in bed  HEENT: NCAT, EOMI  CV: RRR, Ns1/s2, no m/r/g, JVP not elevated  RESP: CTA B/l, no w/r/r  ABD: soft, nt, nd  EXT: warm, 2+ pulses, no edema  SKIN: no visible lesions, rashes  NEURO: AAOx3, no focal deficits  PSYCH: Calm, cooperative    -------------------------------------------------------------------------------------------  LABS:                          13.0   8.93  )-----------( 220      ( 05 Apr 2024 01:34 )             38.6     04-05    136  |  100  |  18  ----------------------------<  166<H>  3.9   |  20<L>  |  0.66    Ca    9.7      05 Apr 2024 01:35    TPro  8.2  /  Alb  4.3  /  TBili  0.7  /  DBili  x   /  AST  28  /  ALT  31  /  AlkPhos  92  04-05      CARDIAC MARKERS ( 05 Apr 2024 04:17 )  639 ng/L / x     / x     / x     / x     / 2.2 ng/mL  CARDIAC MARKERS ( 05 Apr 2024 01:35 )  696 ng/L / x     / x     / 133 U/L / x     / 2.0 ng/mL  CARDIAC MARKERS ( 01 Apr 2024 10:21 )  405 ng/L / x     / x     / 351 U/L / x     / 26.9 ng/mL            -------------------------------------------------------------------------------------------  Meds:    -------------------------------------------------------------------------------------------  Cardiovascular Diagnostic Testing:  TTE 4/1/2024:  CONCLUSIONS:      1. Left ventricular cavity is normal in size. Left ventricular wall thickness is normal. Left ventricular systolic function is normal with an ejection fraction of 62 % by Decker's method of disks. There are no regional wall motion abnormalities seen.   2. Normal left ventricular diastolic function, with normal filling pressure.   3. Normal right ventricular cavity size, with normal wall thickness, and normal systolic function.   4. Normal left andright atrial size.   5. No significant valvular disease.   6. No pericardial effusion seen.   7. No prior echocardiogram is available for comparison.    ________________________________________________________________________________________  FINDINGS:     Left Ventricle:  The left ventricular cavity is normal in size. Left ventricular wall thickness is normal. Left ventricular systolic function is normal with a calculated ejection fraction of 62 % by the Decker's biplane method of disks. There are no regional wall motion abnormalities seen. There is normal left ventricular diastolic function, with normal filling pressure.     Right Ventricle:  The right ventricular cavity is normal in size, with normal wall thickness and normal systolic function. Tricuspid annular plane systolic excursion (TAPSE) is 2.6 cm (normal >=1.7 cm).     Left Atrium:  The left atrium is normal with an indexed volume of 19.60 ml/m².     Right Atrium:  The right atrium is normal in size.     Interatrial Septum:  The interatrial septum appears intact.     Aortic Valve:  The aortic valve is tricuspid with normal leaflet excursion. There is no aortic valve stenosis. There is no evidence of aortic regurgitation.     Mitral Valve:  Structurally normal mitral valve with normal leaflet excursion. There is normal leaflet mobility of the mitral valve. There is no mitral valve stenosis. There is mild mitral regurgitation.     Tricuspid Valve:  Structurally normal tricuspid valve with normal leaflet excursion. There is trace tricuspid regurgitation.     Pulmonic Valve:  Structurally normal pulmonic valve with normal leaflet excursion. There is trace pulmonic regurgitation.     Aorta:  The aortic root appears normal in size.     Pericardium:  No pericardial effusion seen.     Systemic Veins:  The inferior vena cava is normal in size (normal <2.1cm) with normal inspiratory collapse (normal >50%) consistent with normal right atrial pressure (~3, range 0-5mmHg).    [facility] 4/1/2024:  Conclusions:   1. Occluded culprit pRCA s/p PCI with SATISH     2. Severe atherosclerosis of the mLCx   3. Moderate atherosclerosis of the mLAD   4. LVEDP 21, LVEF 40%, posterior-basal hypokinesis   5. IABP placement   Recommendations:     Continue DAPT for at least 1 year and return for staged PCI of the  mLCx.  IABP was placed for ongoing CP, hypotension, BELEM.       Coronary Angiography   The coronary circulation is right dominant.      LM   Left main artery: Angiography shows no disease.      LAD   Mid left anterior descending: Angiography shows moderate  atherosclerosis. There is a 40 % stenosis. Proximal left anterior  descending: Angiography shows moderate atherosclerosis.      CX   Mid circumflex: Angiography shows severe atherosclerosis. There is a  99 % stenosis.    RCA   Proximal right coronary artery: Angiography shows complete occlusion.  There is a 100 % stenosis.    Left Heart Cath   Ejection fraction is 40%. LV to AO pullback was performed. [facility]  performed: Aortic valve crossed and left ventricular pressures  were obtained.      [facility] 4/2/2024:  Conclusions:   Successful PCI to the LCX.     DAPT for 12 months in setting of recent ACS.   Aggressive medical managent and risk modification.         -------------------------------------------------------------------------------------------  Assessment and Plan:     #  #  #    Recommendations:  -     Note is incomplete    *** Recommendations are preliminary until cosigned by the attending.    Efren Rob MD  Cardiology Fellow    For all New Consults and Questions:  [email or web address]   Login: DocDep   Cardiology Consult Note   [Please check [email or web address] password: "[email or web address]" for cardiology service schedule and contact information]    History of Present Illness:   Ms. Atwood is a 52 yo F w/ hx of T2DM and recently dx CAD w/ inferior STEMI s/p SATISH to RCA and LCx (residual 40% LAD) who presented to the ED w/ abd pain, nausea and emesis.    Pt was recently admitted on 4/1 after presenting w/ L sided chest pain found to have an inferior STEMI taken to the cath lab where she was found to have 100% RCA and 99% LCx. She received 1x SATISH to the RCA and was was admitted to the CICU and underwent staged PCI to the LCx the following day. TTE showed preserved EF 62% w/ no WMA and no valvular disease; she ultimately did well. She was dc'ed on ASA 81mg, atrova 80mg, clopidogrel 75mg, carvedilol 12.5mg BID. Her BPs were normal/borderline low so additional afterload agents were deferred.     She was dc'ed on 4/3 and went home feeling well. In the evening of 4/4 she developed abd discomfort, nausea and multiple episodes of emesis after eating a pomegranate. She came to the ED for further evaluation where she denied chest pain, sob, palpitations, lightheadedness, dizziness, fevers, chills, LE swelling. Importantly, these sx are not the same as what she presented w/ during her STEMI (she had L sided chest pain radiating to the L arm and jaw). She was HDS w/ normal HR, BP and SpO2 on RA. EKG showed NSR w/ normal axis, normal intervals, and biphasic T-waves in leads III and aVF, overall unchanged from her prior post-PCI. Labs showed normal CBC, normal lytes, Cr 0.66, HS-trop 696 > 639 (was last 405 on 4/1 prior to PCI), neg CKMB x2, , and CXR was clear.      HOME CARDIAC MEDS:  ASA 81mg  Atrova 80mg  Clopidogrel 75mg  Carvedilol 12.5mg BID    PMHx: reviewed, see below  SOCIAL HISTORY:  unchanged    MEDICATIONS:      REVIEW OF SYSTEMS:  CV: chest pain (-), palpitation (-), orthopnea (-), PND (-), edema (-)  PULM: SOB (-), cough (-), wheezing (-), hemoptysis (-).   CONST: fever (-), chills (-) or fatigue (-)  GI: abdominal distension (-), abdominal pain (+) , nausea/vomiting (+), hematemesis, (-), melena (-), hematochezia (-)  : dysuria (-), frequency (-), hematuria (-).   NEURO: numbness (-), weakness (-), dizziness (-)  SKIN: itching (-), rash (-)  HEENT:  visual changes (-); vertigo or throat pain (-);  neck stiffness (-)     All other review of systems is negative unless indicated above.   -------------------------------------------------------------------------------------------  VITALS:  T(C): 36.8 (04-05-24 @ 00:57), Max: 36.8 (04-05-24 @ 00:57)  HR: 98 (04-05-24 @ 00:57) (98 - 98)  BP: 130/84 (04-05-24 @ 00:57) (130/84 - 130/84)  RR: 20 (04-05-24 @ 00:57) (20 - 20)  SpO2: 96% (04-05-24 @ 00:57) (96% - 96%)  Wt(kg): --  I&O's Summary      PHYSICAL EXAM:  GEN: NAD, sitting in bed  HEENT: NCAT, EOMI  CV: RRR, Ns1/s2, no m/r/g, JVP not elevated  RESP: CTA B/l, no w/r/r  ABD: soft, nt, nd  EXT: warm, 2+ pulses, no edema  SKIN: no visible lesions, rashes  NEURO: AAOx3, no focal deficits  PSYCH: Calm, cooperative    -------------------------------------------------------------------------------------------  LABS:                          13.0   8.93  )-----------( 220      ( 05 Apr 2024 01:34 )             38.6     04-05    136  |  100  |  18  ----------------------------<  166<H>  3.9   |  20<L>  |  0.66    Ca    9.7      05 Apr 2024 01:35    TPro  8.2  /  Alb  4.3  /  TBili  0.7  /  DBili  x   /  AST  28  /  ALT  31  /  AlkPhos  92  04-05      CARDIAC MARKERS ( 05 Apr 2024 04:17 )  639 ng/L / x     / x     / x     / x     / 2.2 ng/mL  CARDIAC MARKERS ( 05 Apr 2024 01:35 )  696 ng/L / x     / x     / 133 U/L / x     / 2.0 ng/mL  CARDIAC MARKERS ( 01 Apr 2024 10:21 )  405 ng/L / x     / x     / 351 U/L / x     / 26.9 ng/mL            -------------------------------------------------------------------------------------------  Meds:    -------------------------------------------------------------------------------------------  Cardiovascular Diagnostic Testing:  TTE 4/1/2024:  CONCLUSIONS:      1. Left ventricular cavity is normal in size. Left ventricular wall thickness is normal. Left ventricular systolic function is normal with an ejection fraction of 62 % by Decker's method of disks. There are no regional wall motion abnormalities seen.   2. Normal left ventricular diastolic function, with normal filling pressure.   3. Normal right ventricular cavity size, with normal wall thickness, and normal systolic function.   4. Normal left and right atrial size.   5. No significant valvular disease.   6. No pericardial effusion seen.   7. No prior echocardiogram is available for comparison.    ________________________________________________________________________________________  FINDINGS:     Left Ventricle:  The left ventricular cavity is normal in size. Left ventricular wall thickness is normal. Left ventricular systolic function is normal with a calculated ejection fraction of 62 % by the Decker's biplane method of disks. There are no regional wall motion abnormalities seen. There is normal left ventricular diastolic function, with normal filling pressure.     Right Ventricle:  The right ventricular cavity is normal in size, with normal wall thickness and normal systolic function. Tricuspid annular plane systolic excursion (TAPSE) is 2.6 cm (normal >=1.7 cm).     Left Atrium:  The left atrium is normal with an indexed volume of 19.60 ml/m².     Right Atrium:  The right atrium is normal in size.     Interatrial Septum:  The interatrial septum appears intact.     Aortic Valve:  The aortic valve is tricuspid with normal leaflet excursion. There is no aortic valve stenosis. There is no evidence of aortic regurgitation.     Mitral Valve:  Structurally normal mitral valve with normal leaflet excursion. There is normal leaflet mobility of the mitral valve. There is no mitral valve stenosis. There is mild mitral regurgitation.     Tricuspid Valve:  Structurally normal tricuspid valve with normal leaflet excursion. There is trace tricuspid regurgitation.     Pulmonic Valve:  Structurally normal pulmonic valve with normal leaflet excursion. There is trace pulmonic regurgitation.     Aorta:  The aortic root appears normal in size.     Pericardium:  No pericardial effusion seen.     Systemic Veins:  The inferior vena cava is normal in size (normal <2.1cm) with normal inspiratory collapse (normal >50%) consistent with normal right atrial pressure (~3, range 0-5mmHg).    [facility] 4/1/2024:  Conclusions:   1. Occluded culprit pRCA s/p PCI with SATISH     2. Severe atherosclerosis of the mLCx   3. Moderate atherosclerosis of the mLAD   4. LVEDP 21, LVEF 40%, posterior-basal hypokinesis   5. IABP placement   Recommendations:     Continue DAPT for at least 1 year and return for staged PCI of the  mLCx.  IABP was placed for ongoing CP, hypotension, BELEM.       Coronary Angiography   The coronary circulation is right dominant.      LM   Left main artery: Angiography shows no disease.      LAD   Mid left anterior descending: Angiography shows moderate  atherosclerosis. There is a 40 % stenosis. Proximal left anterior  descending: Angiography shows moderate atherosclerosis.      CX   Mid circumflex: Angiography shows severe atherosclerosis. There is a  99 % stenosis.    RCA   Proximal right coronary artery: Angiography shows complete occlusion.  There is a 100 % stenosis.    Left Heart Cath   Ejection fraction is 40%. LV to AO pullback was performed. [facility]  performed: Aortic valve crossed and left ventricular pressures  were obtained.      [facility] 4/2/2024:  Conclusions:   Successful PCI to the LCX.     DAPT for 12 months in setting of recent ACS.   Aggressive medical managent and risk modification.         -------------------------------------------------------------------------------------------  Assessment and Plan:   Ms. Atwood is a 52 yo F w/ hx of T2DM and recently dx CAD w/ inferior STEMI s/p SATISH to RCA and LCx (residual 40% LAD) who presented to the ED w/ abd pain, nausea and emesis found to have neg CKMB, downtrending trop, no ischemic changes on EKG and stable vitals / labs otherwise. Cardiology consulted iso recent PCI.    #CAD  #Abd pain  Pt w/ recent STEMI s/p PCI 2d ago, did well but 1d after dc developed abd pain, nausea and vomiting associated w/ po intake prompting ED presentation. Pt HDS, EKG w/o ischemic changes compared to prior post-PCI, trop 696 > 639, CKMB neg x2 and other labs all wnl. Overall low suspicion for new ACS, including very low suspicion for in-stent thrombosis. Pt also w/o signs of poor perfusion and no signs of congestion to suggest post-MI mechanical complications. Possible that pt may have mild post-MI pericarditis, although sx are less consistent w/ this dx and pt has no EKG changes to suggest the dx.    Recommendations:  - no indication for AC nor r/p LHC at this time  - no need for r/p TTE as no change in hemodynamics, no recurrent chest pain  - could check ESR/CRP  - would evaluate for other etiologies of abd pain, nausea and emesis   - continue ASA 81mg, clopidogrel 75mg, atrova 80mg  - continue carvedilol 12.5mg BID    *** Recommendations are preliminary until cosigned by the attending.    Efren Rob MD  Cardiology Fellow    For all New Consults and Questions:  [email or web address]   Login: [email or web address]   Cardiology Consult Note   [Please check [email or web address] password: "[email or web address]" for cardiology service schedule and contact information]    History of Present Illness:   Ms. Atwood is a 52 yo F w/ hx of T2DM and recently dx CAD w/ inferior STEMI s/p SATISH to RCA and LCx (residual 40% LAD) who presented to the ED w/ nausea and emesis.    Pt was recently admitted on 4/1 after presenting w/ L sided chest pain found to have an inferior STEMI taken to the cath lab where she was found to have 100% RCA and 99% LCx. She received 1x SATISH to the RCA and was was admitted to the CICU and underwent staged PCI to the LCx the following day. TTE showed preserved EF 62% w/ no WMA and no valvular disease; she ultimately did well. She was dc'ed on ASA 81mg, atrova 80mg, clopidogrel 75mg, carvedilol 12.5mg BID. Her BPs were normal/borderline low so additional afterload agents were deferred.     She was dc'ed on 4/3 and went home feeling well. On 4/4 around 2pm she developed nausea and multiple episodes of dry heaving/bilious emesis after eating a pomegranate. She reports she had taken her carvedilol roughly 1-2hr prior as well as her other cardiac meds. Her daughter checked her BP during the initial nausea which was 90s/50s so they gave her some water and crackers w/ improvement in her BP within about 30min but the nausea and dry heaving persisted. She came to the ED for further evaluation where she denied chest pain, sob, palpitations, lightheadedness, dizziness, fevers, chills, LE swelling. Importantly, these sx are not the same as what she presented w/ during her STEMI (she had L sided chest pain radiating to the L arm and jaw). She also reports mild soreness to her R femoral groin access site, mild bruising at the R radial site but no swelling nor bleeding from either location. In the ED she was HDS w/ BP 130s/80s, HR 90s, normal SpO2 on RA. EKG showed NSR w/ normal axis, normal intervals, and biphasic T-waves in leads III and aVF, overall unchanged from her prior post-PCI EKG from 2d prior. Labs showed normal CBC, normal lytes, Cr 0.66, HS-trop 696 > 639 (was last 405 on 4/1 prior to PCI), neg CKMB x2, , and CXR was clear. Pt was given 1L of IVF, tylenol and ondansetron in the ED w/ improvement in her nausea.      HOME CARDIAC MEDS:  ASA 81mg  Atrova 80mg  Clopidogrel 75mg  Carvedilol 12.5mg BID    PMHx: reviewed, see below  SOCIAL HISTORY:  unchanged    MEDICATIONS:      REVIEW OF SYSTEMS:  CV: chest pain (-), palpitation (-), orthopnea (-), PND (-), edema (-)  PULM: SOB (-), cough (-), wheezing (-), hemoptysis (-).   CONST: fever (-), chills (-) or fatigue (-)  GI: abdominal distension (-), abdominal pain (-) , nausea/vomiting (+), hematemesis, (-), melena (-), hematochezia (-)  : dysuria (-), frequency (-), hematuria (-).   NEURO: numbness (-), weakness (-), dizziness (-)  SKIN: itching (-), rash (-)  HEENT:  visual changes (-); vertigo or throat pain (-);  neck stiffness (-)     All other review of systems is negative unless indicated above.   -------------------------------------------------------------------------------------------  VITALS:  T(C): 36.8 (04-05-24 @ 00:57), Max: 36.8 (04-05-24 @ 00:57)  HR: 98 (04-05-24 @ 00:57) (98 - 98)  BP: 130/84 (04-05-24 @ 00:57) (130/84 - 130/84)  RR: 20 (04-05-24 @ 00:57) (20 - 20)  SpO2: 96% (04-05-24 @ 00:57) (96% - 96%)  Wt(kg): --  I&O's Summary      PHYSICAL EXAM:  GEN: NAD, sitting in bed  HEENT: NCAT, EOMI  CV: RRR, Ns1/s2, no m/r/g, JVP not elevated, R radial access site w/ mild bruising, non-tender, no sweeling; R femoral access site w/ dry gauze in place w/o swelling or ttp.  RESP: CTA B/l, no w/r/r  ABD: soft, nt, nd  EXT: warm, 2+ pulses, no edema  SKIN: no visible lesions, rashes  NEURO: AAOx3, no focal deficits  PSYCH: Calm, cooperative    -------------------------------------------------------------------------------------------  LABS:                          13.0   8.93  )-----------( 220      ( 05 Apr 2024 01:34 )             38.6     04-05    136  |  100  |  18  ----------------------------<  166<H>  3.9   |  20<L>  |  0.66    Ca    9.7      05 Apr 2024 01:35    TPro  8.2  /  Alb  4.3  /  TBili  0.7  /  DBili  x   /  AST  28  /  ALT  31  /  AlkPhos  92  04-05      CARDIAC MARKERS ( 05 Apr 2024 04:17 )  639 ng/L / x     / x     / x     / x     / 2.2 ng/mL  CARDIAC MARKERS ( 05 Apr 2024 01:35 )  696 ng/L / x     / x     / 133 U/L / x     / 2.0 ng/mL  CARDIAC MARKERS ( 01 Apr 2024 10:21 )  405 ng/L / x     / x     / 351 U/L / x     / 26.9 ng/mL            -------------------------------------------------------------------------------------------  Meds:    -------------------------------------------------------------------------------------------  Cardiovascular Diagnostic Testing:  TTE 4/1/2024:  CONCLUSIONS:      1. Left ventricular cavity is normal in size. Left ventricular wall thickness is normal. Left ventricular systolic function is normal with an ejection fraction of 62 % by Decker's method of disks. There are no regional wall motion abnormalities seen.   2. Normal left ventricular diastolic function, with normal filling pressure.   3. Normal right ventricular cavity size, with normal wall thickness, and normal systolic function.   4. Normal left and right atrial size.   5. No significant valvular disease.   6. No pericardial effusion seen.   7. No prior echocardiogram is available for comparison.    ________________________________________________________________________________________  FINDINGS:     Left Ventricle:  The left ventricular cavity is normal in size. Left ventricular wall thickness is normal. Left ventricular systolic function is normal with a calculated ejection fraction of 62 % by the Decker's biplane method of disks. There are no regional wall motion abnormalities seen. There is normal left ventricular diastolic function, with normal filling pressure.     Right Ventricle:  The right ventricular cavity is normal in size, with normal wall thickness and normal systolic function. Tricuspid annular plane systolic excursion (TAPSE) is 2.6 cm (normal >=1.7 cm).     Left Atrium:  The left atrium is normal with an indexed volume of 19.60 ml/m².     Right Atrium:  The right atrium is normal in size.     Interatrial Septum:  The interatrial septum appears intact.     Aortic Valve:  The aortic valve is tricuspid with normal leaflet excursion. There is no aortic valve stenosis. There is no evidence of aortic regurgitation.     Mitral Valve:  Structurally normal mitral valve with normal leaflet excursion. There is normal leaflet mobility of the mitral valve. There is no mitral valve stenosis. There is mild mitral regurgitation.     Tricuspid Valve:  Structurally normal tricuspid valve with normal leaflet excursion. There is trace tricuspid regurgitation.     Pulmonic Valve:  Structurally normal pulmonic valve with normal leaflet excursion. There is trace pulmonic regurgitation.     Aorta:  The aortic root appears normal in size.     Pericardium:  No pericardial effusion seen.     Systemic Veins:  The inferior vena cava is normal in size (normal <2.1cm) with normal inspiratory collapse (normal >50%) consistent with normal right atrial pressure (~3, range 0-5mmHg).    [facility] 4/1/2024:  Conclusions:   1. Occluded culprit pRCA s/p PCI with SATISH     2. Severe atherosclerosis of the mLCx   3. Moderate atherosclerosis of the mLAD   4. LVEDP 21, LVEF 40%, posterior-basal hypokinesis   5. IABP placement   Recommendations:     Continue DAPT for at least 1 year and return for staged PCI of the  mLCx.  IABP was placed for ongoing CP, hypotension, BELEM.       Coronary Angiography   The coronary circulation is right dominant.      LM   Left main artery: Angiography shows no disease.      LAD   Mid left anterior descending: Angiography shows moderate  atherosclerosis. There is a 40 % stenosis. Proximal left anterior  descending: Angiography shows moderate atherosclerosis.      CX   Mid circumflex: Angiography shows severe atherosclerosis. There is a  99 % stenosis.    RCA   Proximal right coronary artery: Angiography shows complete occlusion.  There is a 100 % stenosis.    Left Heart Cath   Ejection fraction is 40%. LV to AO pullback was performed. [facility]  performed: Aortic valve crossed and left ventricular pressures  were obtained.      [facility] 4/2/2024:  Conclusions:   Successful PCI to the LCX.     DAPT for 12 months in setting of recent ACS.   Aggressive medical managent and risk modification.         -------------------------------------------------------------------------------------------  Assessment and Plan:   Ms. Atwood is a 52 yo F w/ hx of T2DM and recently dx CAD w/ inferior STEMI s/p SATISH to RCA and LCx (residual 40% LAD) who presented to the ED w/ nausea and emesis found to have neg CKMB, downtrending trop, no ischemic changes on EKG and stable vitals / labs otherwise. Cardiology consulted iso recent PCI.    #CAD  #Abd pain  Pt w/ recent STEMI s/p PCI 2d ago, did well but 1d after dc developed nausea and vomiting 1-2hr after taking her home meds, w/ mild hypotension at home that improved despite persistent sx, prompting ED presentation. Pt HDS w/ normal BP, EKG w/o ischemic changes compared to prior post-PCI, trop 696 > 639, CKMB neg x2 and other labs all wnl. Overall low suspicion for new ACS, including very low suspicion for in-stent thrombosis. Pt also w/o signs of poor perfusion and no signs of congestion to suggest post-MI mechanical complications. Possible that pt became transiently hypotensive iso poor po intake since prev hospitalization, and newly added carvedilol however odd that nausea and emesis persisted after BP normalized at home. Less likely that pt has crys-MI pericarditis, as sx are less consistent, there are no LA nor ST changes, and pt does not have a friction rub on exam    Recommendations:  - encourage po intake, suspect pt may have been hypovolemic and mildly hypotensive prompting n/v  - could consider dose reducing carvedilol to 6.25mg BID (from 12.5mg BID)  - no indication for AC nor r/p LHC at this time  - no need for r/p TTE as no change in hemodynamics, no recurrent chest pain  - continue ASA 81mg, clopidogrel 75mg, atrova 80mg  - pt encouraged to call the cardiology clinic to arrange a f/up appt w/ Dr. Cohn within the next week    *** Recommendations are preliminary until cosigned by the attending.    Efren Rob MD  Cardiology Fellow    For all New Consults and Questions:  [email or web address]   Login: [email or web address]   Cardiology Consult Note   [Please check [email or web address]: [email or web address]" for cardiology service schedule and contact information]    History of Present Illness:   Ms. Atwood is a 52 yo F w/ hx of T2DM and recently dx CAD w/ inferior STEMI s/p SATISH to RCA and LCx (residual 40% LAD) who presented to the ED w/ nausea and emesis.    Pt was recently admitted on 4/1 after presenting w/ L sided chest pain found to have an inferior STEMI taken to the cath lab where she was found to have 100% RCA and 99% LCx. She received 1x SATISH to the RCA and was was admitted to the CICU and underwent staged PCI to the LCx the following day. TTE showed preserved EF 62% w/ no WMA and no valvular disease; she ultimately did well. She was dc'ed on ASA 81mg, atrova 80mg, clopidogrel 75mg, carvedilol 12.5mg BID. Her BPs were normal/borderline low so additional afterload agents were deferred.     She was dc'ed on 4/3 and went home feeling well. On 4/4 around 2pm she developed nausea and multiple episodes of dry heaving/bilious emesis after eating a pomegranate. She reports she had taken her carvedilol roughly 1-2hr prior as well as her other cardiac meds. Her daughter checked her BP during the initial nausea which was 90s/50s so they gave her some water and crackers w/ improvement in her BP within about 30min but the nausea and dry heaving persisted. She came to the ED for further evaluation where she denied chest pain, sob, palpitations, lightheadedness, dizziness, fevers, chills, LE swelling. Importantly, these sx are not the same as what she presented w/ during her STEMI (she had L sided chest pain radiating to the L arm and jaw). She also reports mild soreness to her R femoral groin access site, mild bruising at the R radial site but no swelling nor bleeding from either location. In the ED she was HDS w/ BP 130s/80s, HR 90s, normal SpO2 on RA. EKG showed NSR w/ normal axis, normal intervals, and biphasic T-waves in leads III and aVF, overall unchanged from her prior post-PCI EKG from 2d prior. Labs showed normal CBC, normal lytes, Cr 0.66, HS-trop 696 > 639 (was last 405 on 4/1 prior to PCI), neg CKMB x2, , and CXR was clear. Pt was given 1L of IVF, tylenol and ondansetron in the ED w/ improvement in her nausea.      HOME CARDIAC MEDS:  ASA 81mg  Atrova 80mg  Clopidogrel 75mg  Carvedilol 12.5mg BID    PMHx: reviewed, see below  SOCIAL HISTORY:  unchanged    MEDICATIONS:      REVIEW OF SYSTEMS:  CV: chest pain (-), palpitation (-), orthopnea (-), PND (-), edema (-)  PULM: SOB (-), cough (-), wheezing (-), hemoptysis (-).   CONST: fever (-), chills (-) or fatigue (-)  GI: abdominal distension (-), abdominal pain (-) , nausea/vomiting (+), hematemesis, (-), melena (-), hematochezia (-)  : dysuria (-), frequency (-), hematuria (-).   NEURO: numbness (-), weakness (-), dizziness (-)  SKIN: itching (-), rash (-)  HEENT:  visual changes (-); vertigo or throat pain (-);  neck stiffness (-)     All other review of systems is negative unless indicated above.   -------------------------------------------------------------------------------------------  VITALS:  T(C): 36.8 (04-05-24 @ 00:57), Max: 36.8 (04-05-24 @ 00:57)  HR: 98 (04-05-24 @ 00:57) (98 - 98)  BP: 130/84 (04-05-24 @ 00:57) (130/84 - 130/84)  RR: 20 (04-05-24 @ 00:57) (20 - 20)  SpO2: 96% (04-05-24 @ 00:57) (96% - 96%)  Wt(kg): --  I&O's Summary      PHYSICAL EXAM:  GEN: NAD, sitting in bed  HEENT: NCAT, EOMI  CV: RRR, Ns1/s2, no m/r/g, JVP not elevated, R radial access site w/ mild bruising, non-tender, no sweeling; R femoral access site w/ dry gauze in place w/o swelling or ttp.  RESP: CTA B/l, no w/r/r  ABD: soft, nt, nd  EXT: warm, 2+ pulses, no edema  SKIN: no visible lesions, rashes  NEURO: AAOx3, no focal deficits  PSYCH: Calm, cooperative    -------------------------------------------------------------------------------------------  LABS:                          13.0   8.93  )-----------( 220      ( 05 Apr 2024 01:34 )             38.6     04-05    136  |  100  |  18  ----------------------------<  166<H>  3.9   |  20<L>  |  0.66    Ca    9.7      05 Apr 2024 01:35    TPro  8.2  /  Alb  4.3  /  TBili  0.7  /  DBili  x   /  AST  28  /  ALT  31  /  AlkPhos  92  04-05      CARDIAC MARKERS ( 05 Apr 2024 04:17 )  639 ng/L / x     / x     / x     / x     / 2.2 ng/mL  CARDIAC MARKERS ( 05 Apr 2024 01:35 )  696 ng/L / x     / x     / 133 U/L / x     / 2.0 ng/mL  CARDIAC MARKERS ( 01 Apr 2024 10:21 )  405 ng/L / x     / x     / 351 U/L / x     / 26.9 ng/mL            -------------------------------------------------------------------------------------------  Meds:    -------------------------------------------------------------------------------------------  Cardiovascular Diagnostic Testing:  [facility] 4/1/2024:  CONCLUSIONS:      1. Left ventricular cavity is normal in size. Left ventricular wall thickness is normal. Left ventricular systolic function is normal with an ejection fraction of 62 % by Decker's method of disks. There are no regional wall motion abnormalities seen.   2. Normal left ventricular diastolic function, with normal filling pressure.   3. Normal right ventricular cavity size, with normal wall thickness, and normal systolic function.   4. Normal left and right atrial size.   5. No significant valvular disease.   6. No pericardial effusion seen.   7. No prior echocardiogram is available for comparison.    ________________________________________________________________________________________  FINDINGS:     Left Ventricle:  The left ventricular cavity is normal in size. Left ventricular wall thickness is normal. Left ventricular systolic function is normal with a calculated ejection fraction of 62 % by the Decker's biplane method of disks. There are no regional wall motion abnormalities seen. There is normal left ventricular diastolic function, with normal filling pressure.     Right Ventricle:  The right ventricular cavity is normal in size, with normal wall thickness and normal systolic function. Tricuspid annular plane systolic excursion (TAPSE) is 2.6 cm (normal >=1.7 cm).     Left Atrium:  The left atrium is normal with an indexed volume of 19.60 ml/m².     Right Atrium:  The right atrium is normal in size.     Interatrial Septum:  The interatrial septum appears intact.     Aortic Valve:  The aortic valve is tricuspid with normal leaflet excursion. There is no aortic valve stenosis. There is no evidence of aortic regurgitation.     Mitral Valve:  Structurally normal mitral valve with normal leaflet excursion. There is normal leaflet mobility of the mitral valve. There is no mitral valve stenosis. There is mild mitral regurgitation.     Tricuspid Valve:  Structurally normal tricuspid valve with normal leaflet excursion. There is trace tricuspid regurgitation.     Pulmonic Valve:  Structurally normal pulmonic valve with normal leaflet excursion. There is trace pulmonic regurgitation.     Aorta:  The aortic root appears normal in size.     Pericardium:  No pericardial effusion seen.     Systemic Veins:  The inferior vena cava is normal in size (normal <2.1cm) with normal inspiratory collapse (normal >50%) consistent with normal right atrial pressure (~3, range 0-5mmHg).    [facility] 4/1/2024:  Conclusions:   1. Occluded culprit pRCA s/p PCI with SATISH     2. Severe atherosclerosis of the mLCx   3. Moderate atherosclerosis of the mLAD   4. LVEDP 21, LVEF 40%, posterior-basal hypokinesis   5. IABP placement   Recommendations:     Continue DAPT for at least 1 year and return for staged PCI of the  mLCx.  IABP was placed for ongoing CP, hypotension, BELEM.       Coronary Angiography   The coronary circulation is right dominant.      LM   Left main artery: Angiography shows no disease.      LAD   Mid left anterior descending: Angiography shows moderate  atherosclerosis. There is a 40 % stenosis. Proximal left anterior  descending: Angiography shows moderate atherosclerosis.      CX   Mid circumflex: Angiography shows severe atherosclerosis. There is a  99 % stenosis.    RCA   Proximal right coronary artery: Angiography shows complete occlusion.  There is a 100 % stenosis.    Left Heart Cath   Ejection fraction is 40%. LV to AO pullback was performed. [facility]  performed: Aortic valve crossed and left ventricular pressures  were obtained.      [facility] 4/2/2024:  Conclusions:   Successful PCI to the LCX.     DAPT for 12 months in setting of recent ACS.   Aggressive medical managent and risk modification.         -------------------------------------------------------------------------------------------  Assessment and Plan:   Ms. Atwood is a 52 yo F w/ hx of T2DM and recently dx CAD w/ inferior STEMI s/p SATISH to RCA and LCx (residual 40% LAD) who presented to the ED w/ nausea and emesis found to have neg CKMB, downtrending trop, no ischemic changes on EKG and stable vitals / labs otherwise. Cardiology consulted iso recent PCI.    #CAD  #Abd pain  Pt w/ recent STEMI s/p PCI 2d ago, did well but 1d after dc developed nausea and vomiting 1-2hr after taking her home meds, w/ mild hypotension at home that improved despite persistent sx, prompting ED presentation. Pt HDS w/ normal BP, EKG w/o ischemic changes compared to prior post-PCI, trop 696 > 639, CKMB neg x2 and other labs all wnl. Overall low suspicion for new ACS, including very low suspicion for in-stent thrombosis. Pt also w/o signs of poor perfusion and no signs of congestion to suggest post-MI mechanical complications. Possible that pt became transiently hypotensive iso poor po intake since prev hospitalization, and newly added carvedilol however odd that nausea and emesis persisted after BP normalized at home. Less likely that pt has crys-MI pericarditis, as sx are less consistent, there are no PA nor ST changes, and pt does not have a friction rub on exam    Recommendations:  - encourage po intake, suspect pt may have been hypovolemic and mildly hypotensive prompting n/v  - would stop carvedilol and start metop succinate 25mg instead  - no indication for AC nor r/p LHC at this time  - no need for r/p TTE as no change in hemodynamics, no recurrent chest pain  - continue ASA 81mg, clopidogrel 75mg, atrova 80mg  - pt encouraged to call the cardiology clinic to arrange a f/up appt w/ Dr. Cohn within the next week    *** Recommendations are preliminary until cosigned by the attending.    Efren Rob MD  Cardiology Fellow    For all New Consults and Questions:  [email or web address]   Login: [email or web address]

## 2024-04-05 NOTE — ED PROVIDER NOTE - OBJECTIVE STATEMENT
53-year-old female recently discharged for cardiac stent placements on April 1 and 2 presenting emergency department due to nausea and vomiting starting today.  Patient states symptoms started after eating pomegranates she was told to come to the emergency department.  Patient denies any chest pain, shortness of breath, swelling of the ankles, fevers, chills, body aches.

## 2024-04-05 NOTE — ED PROVIDER NOTE - NSFOLLOWUPINSTRUCTIONS_ED_ALL_ED_FT
You were seen emergency department today due to nausea and vomiting after having stent placement this past Tuesday.  In the emergency department we performed a cardiac workup to ensure that your symptoms were not due to cardiac etiology.  Cardiology also evaluated you and we determined that the symptoms you are experiencing are not due to any cardiac etiology.  We recommend you take half of your normal dose of carvedilol and be reevaluated by your cardiology team.  Please return to the emergency department if experience symptoms such as chest pain, shortness of breath, loss of consciousness, fevers, chills, body aches.  Please follow-up with neurologist within the next week. You were seen emergency department today due to nausea and vomiting after having stent placement this past Tuesday.  In the emergency department we performed a cardiac workup to ensure that your symptoms were not due to cardiac etiology.  Cardiology also evaluated you and we determined that the symptoms you are experiencing are not due to any cardiac etiology.  We recommend you take half of your normal dose of carvedilol and be reevaluated by your cardiology team.  Please return to the emergency department if experience symptoms such as chest pain, shortness of breath, loss of consciousness, fevers, chills, body aches.  Please follow-up with cardiology within the next week.

## 2024-04-05 NOTE — CONSULT NOTE ADULT - ATTENDING COMMENTS
Returns 2 days after coronary stent placement, symptoms seem unrelated to recurrent myocardial ischemia and have resolved.

## 2024-04-05 NOTE — ED ADULT NURSE NOTE - NURSING GU BLADDER
You can access the FollowMyHealth Patient Portal offered by [facility] by registering at the following website: [email or web address]. By joining [facility]’s FollowMyHealth portal, you will also be able to view your health information using other applications (apps) compatible with our system. non-distended

## 2024-04-05 NOTE — ED ADULT NURSE REASSESSMENT NOTE - NS ED NURSE REASSESS COMMENT FT1
awaiting cardiology consult. pt made aware of plan for cardiology consul. extra blanket provided and pt on cardiac monitor. pending cards consult, pt w/ no further needs at this time.

## 2024-04-05 NOTE — ED PROVIDER NOTE - PATIENT PORTAL LINK FT
You can access the FollowMyHealth Patient Portal offered by [facility] by registering at the following website: [email or web address]. By joining [facility]’s FollowMyHealth portal, you will also be able to view your health information using other applications (apps) compatible with our system.

## 2024-04-05 NOTE — ED PROVIDER NOTE - PROGRESS NOTE DETAILS
I spoke to cardiology who agreed the patient since her not due to a cardiac etiology.  They recommend patient be discharged and take half the carvedilol dose and to follow-up with her cardiologist within the next.    Gordo Todd MD (PGY 1)

## 2024-04-05 NOTE — ED ADULT NURSE NOTE - OBJECTIVE STATEMENT
53yF hx MI with stents placed this week prestns with nausea/vomiting. pt states she was started on a new medication regimen s/p MI. pt became concerned due to N/V and came to ED. IVL inserted and labs sent. zofran provided for nausea and asa provided. placed on caridac monitor. pending results and dispo.

## 2024-04-09 PROBLEM — Z00.00 ENCOUNTER FOR PREVENTIVE HEALTH EXAMINATION: Status: ACTIVE | Noted: 2024-04-09

## 2024-04-23 ENCOUNTER — APPOINTMENT (OUTPATIENT)
Dept: CARDIOLOGY | Facility: CLINIC | Age: 54
End: 2024-04-23
Payer: COMMERCIAL

## 2024-04-23 ENCOUNTER — NON-APPOINTMENT (OUTPATIENT)
Age: 54
End: 2024-04-23

## 2024-04-23 VITALS
WEIGHT: 126 LBS | HEART RATE: 60 BPM | OXYGEN SATURATION: 100 % | HEIGHT: 63 IN | DIASTOLIC BLOOD PRESSURE: 68 MMHG | BODY MASS INDEX: 22.32 KG/M2 | SYSTOLIC BLOOD PRESSURE: 102 MMHG

## 2024-04-23 DIAGNOSIS — I21.11 ST ELEVATION (STEMI) MYOCARDIAL INFARCTION INVOLVING RIGHT CORONARY ARTERY: ICD-10-CM

## 2024-04-23 DIAGNOSIS — Z86.39 PERSONAL HISTORY OF OTHER ENDOCRINE, NUTRITIONAL AND METABOLIC DISEASE: ICD-10-CM

## 2024-04-23 PROCEDURE — 93000 ELECTROCARDIOGRAM COMPLETE: CPT

## 2024-04-23 PROCEDURE — 99215 OFFICE O/P EST HI 40 MIN: CPT | Mod: 25

## 2024-04-23 RX ORDER — CARVEDILOL 12.5 MG/1
12.5 TABLET, FILM COATED ORAL
Refills: 0 | Status: DISCONTINUED | COMMUNITY
End: 2024-04-23

## 2024-04-23 RX ORDER — ONDANSETRON 4 MG/1
4 TABLET, ORALLY DISINTEGRATING ORAL
Refills: 0 | Status: ACTIVE | COMMUNITY

## 2024-04-23 RX ORDER — METFORMIN HYDROCHLORIDE 1000 MG/1
1000 TABLET, COATED ORAL DAILY
Refills: 0 | Status: ACTIVE | COMMUNITY

## 2024-04-23 RX ORDER — PREGABALIN 75 MG/1
75 CAPSULE ORAL
Refills: 0 | Status: ACTIVE | COMMUNITY
Start: 2024-04-23

## 2024-04-30 ENCOUNTER — APPOINTMENT (OUTPATIENT)
Dept: CARDIOLOGY | Facility: CLINIC | Age: 54
End: 2024-04-30

## 2024-05-06 NOTE — DISCUSSION/SUMMARY
[FreeTextEntry1] : 54 yo woman with recent STEMI RCA PCI and staging LCX 04/2024, DM, HTN, HLD who presents post hospitalization to establish care.   STEMI s/p PCI to the RCA and staged LCx 04/01/24 and 04/02/24 respectively  -cont DAPT  -cardiac rehab referral provided -cont BB I switched her to Toprol XL from Coreg today -no ACEi or ARB low BP -preserved EF per TTE post MI  HLD  -cont Lipitor 80 mg daily -will need eventual check   Follow up with me in one month.  I have spent 40 min on this encounter by seeing the pt, reviewing previous records, disucssing plan of care and documenting this encounter.   [EKG obtained to assist in diagnosis and management of assessed problem(s)] : EKG obtained to assist in diagnosis and management of assessed problem(s)

## 2024-05-06 NOTE — REASON FOR VISIT
[Symptom and Test Evaluation] : symptom and test evaluation [CV Risk Factors and Non-Cardiac Disease] : CV risk factors and non-cardiac disease [Hyperlipidemia] : hyperlipidemia [Coronary Artery Disease] : coronary artery disease [Family Member] : family member

## 2024-05-06 NOTE — HISTORY OF PRESENT ILLNESS
[FreeTextEntry1] : 54 yo woman with recent STEMI RCA PCI and staging LCX 2024, DM, HTN, HLD who presents post hospitalization to establish care.  Pt reports that back in 2018 had an abn stress test has been having yearly stress testing since then.  Since the discharge feels well, had an ED visit on 2024 for N/V which resolved.  Reports compliance with meds.  Her with her daughter.   Still on Coreg, but per dc summary from ED was supposed to be switched to Toprol.    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

## 2024-05-06 NOTE — REVIEW OF SYSTEMS
[Weight Loss (___ Lbs)] : [unfilled] ~Ulb weight loss [Chest Discomfort] : chest discomfort [Numbness (Hypoesthesia)] : numbness [Negative] : Psychiatric [Fever] : no fever [Headache] : no headache [Weight Gain (___ Lbs)] : no recent weight gain [Chills] : no chills [Feeling Fatigued] : not feeling fatigued [Blurry Vision] : no blurred vision [Seeing Double (Diplopia)] : no diplopia [Earache] : no earache [Hearing Loss] : no hearing loss [Sore Throat] : no sore throat [Tinnitus] : no tinnitus [SOB] : no shortness of breath [Dyspnea on exertion] : not dyspnea during exertion [Lower Ext Edema] : no extremity edema [Palpitations] : no palpitations [Orthopnea] : no orthopnea [PND] : no PND [Syncope] : no syncope [Cough] : no cough [Snoring] : no snoring [Abdominal Pain] : no abdominal pain [Nausea] : no nausea [Vomiting] : no vomiting [Diarrhea] : diarrhea [Dysuria] : no dysuria [Abn Vaginal Bleeding] : no unexplained vaginal bleeding [Rash] : no rash [Dizziness] : no dizziness [Weakness] : no weakness [Speech Disturbance] : no speech disturbance [Easy Bleeding] : no tendency for easy bleeding [Easy Bruising] : no tendency for easy bruising [FreeTextEntry5] : chest pain position

## 2024-05-07 RX ORDER — ASPIRIN ENTERIC COATED TABLETS 81 MG 81 MG/1
81 TABLET, DELAYED RELEASE ORAL
Qty: 90 | Refills: 3 | Status: ACTIVE | COMMUNITY
Start: 2024-05-07 | End: 1900-01-01

## 2024-05-07 RX ORDER — METOPROLOL SUCCINATE 25 MG/1
25 TABLET, EXTENDED RELEASE ORAL
Qty: 90 | Refills: 2 | Status: ACTIVE | COMMUNITY
Start: 2024-04-23 | End: 1900-01-01

## 2024-05-07 RX ORDER — ATORVASTATIN CALCIUM 80 MG/1
80 TABLET, FILM COATED ORAL DAILY
Qty: 90 | Refills: 1 | Status: ACTIVE | COMMUNITY
Start: 1900-01-01 | End: 1900-01-01

## 2024-05-07 RX ORDER — CLOPIDOGREL BISULFATE 75 MG/1
75 TABLET, FILM COATED ORAL DAILY
Qty: 90 | Refills: 1 | Status: ACTIVE | COMMUNITY
Start: 1900-01-01 | End: 1900-01-01

## 2024-05-07 RX ORDER — DAPAGLIFLOZIN 5 MG/1
5 TABLET, FILM COATED ORAL DAILY
Qty: 90 | Refills: 1 | Status: ACTIVE | COMMUNITY
Start: 1900-01-01 | End: 1900-01-01

## 2024-05-14 ENCOUNTER — APPOINTMENT (OUTPATIENT)
Dept: CARDIOLOGY | Facility: CLINIC | Age: 54
End: 2024-05-14

## 2024-05-21 ENCOUNTER — NON-APPOINTMENT (OUTPATIENT)
Age: 54
End: 2024-05-21

## 2024-05-21 ENCOUNTER — APPOINTMENT (OUTPATIENT)
Dept: CARDIOLOGY | Facility: CLINIC | Age: 54
End: 2024-05-21
Payer: COMMERCIAL

## 2024-05-21 VITALS
DIASTOLIC BLOOD PRESSURE: 66 MMHG | BODY MASS INDEX: 22.32 KG/M2 | HEIGHT: 63 IN | WEIGHT: 126 LBS | SYSTOLIC BLOOD PRESSURE: 96 MMHG | OXYGEN SATURATION: 99 % | HEART RATE: 68 BPM

## 2024-05-21 DIAGNOSIS — R07.89 OTHER CHEST PAIN: ICD-10-CM

## 2024-05-21 PROCEDURE — 99215 OFFICE O/P EST HI 40 MIN: CPT | Mod: 25

## 2024-05-21 PROCEDURE — 93000 ELECTROCARDIOGRAM COMPLETE: CPT

## 2024-05-21 NOTE — HISTORY OF PRESENT ILLNESS
[FreeTextEntry1] : 52 yo woman with recent STEMI RCA PCI and staging LCX 2024, DM, HTN, HLD who presents for a follow up.  Reports compliance with meds.  Her with her daughter.  Notices low BPs at home. RLE numbness per pt, now resolved, her PCP suggested MRI of the brain, but then plan changed and Doppler of the leg was done, no results as of yet.  Reports CP and SOB on exertion that she has been experiencing for the past month or so.  CP feels like pressure, associated with RUBY.  Resolves with rest.    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

## 2024-05-21 NOTE — DISCUSSION/SUMMARY
[FreeTextEntry1] : 54 yo woman with recent STEMI RCA PCI and staging LCX 04/2024, DM, HTN, HLD who presents for a follow up.   STEMI s/p PCI to the RCA and staged LCx 04/01/24 and 04/02/24 respectively  -cont DAPT  -cardiac rehab referral provided previously -cont BB I previously switched her to Toprol XL from Coreg  -stop Losartan given low BP -preserved EF per TTE post MI  HLD  -cont Lipitor 80 mg daily -will need eventual check   Given typical symptoms in this pt with significant RF and CAD, will send for cardiac cath for further evaluation.   Follow up with me after the procedure.  I have spent 40 min on this encounter by seeing the pt, reviewing previous records, discussing plan of care and documenting this encounter.  She was instructed to hold Metformin and Farxiga 48 hrs before the procedure by me.   [EKG obtained to assist in diagnosis and management of assessed problem(s)] : EKG obtained to assist in diagnosis and management of assessed problem(s)

## 2024-05-29 ENCOUNTER — NON-APPOINTMENT (OUTPATIENT)
Age: 54
End: 2024-05-29

## 2024-05-30 ENCOUNTER — TRANSCRIPTION ENCOUNTER (OUTPATIENT)
Age: 54
End: 2024-05-30

## 2024-05-30 ENCOUNTER — OUTPATIENT (OUTPATIENT)
Dept: OUTPATIENT SERVICES | Facility: HOSPITAL | Age: 54
LOS: 1 days | End: 2024-05-30
Payer: COMMERCIAL

## 2024-05-30 VITALS
RESPIRATION RATE: 16 BRPM | OXYGEN SATURATION: 100 % | WEIGHT: 125 LBS | SYSTOLIC BLOOD PRESSURE: 124 MMHG | HEIGHT: 63 IN | HEART RATE: 64 BPM | TEMPERATURE: 98 F | DIASTOLIC BLOOD PRESSURE: 62 MMHG

## 2024-05-30 VITALS
TEMPERATURE: 98 F | RESPIRATION RATE: 17 BRPM | DIASTOLIC BLOOD PRESSURE: 68 MMHG | OXYGEN SATURATION: 99 % | HEART RATE: 66 BPM | SYSTOLIC BLOOD PRESSURE: 111 MMHG

## 2024-05-30 DIAGNOSIS — Z95.5 PRESENCE OF CORONARY ANGIOPLASTY IMPLANT AND GRAFT: Chronic | ICD-10-CM

## 2024-05-30 DIAGNOSIS — R07.89 OTHER CHEST PAIN: ICD-10-CM

## 2024-05-30 LAB
ANION GAP SERPL CALC-SCNC: 11 MMOL/L — SIGNIFICANT CHANGE UP (ref 5–17)
BUN SERPL-MCNC: 12 MG/DL — SIGNIFICANT CHANGE UP (ref 7–23)
CALCIUM SERPL-MCNC: 10.1 MG/DL — SIGNIFICANT CHANGE UP (ref 8.4–10.5)
CHLORIDE SERPL-SCNC: 105 MMOL/L — SIGNIFICANT CHANGE UP (ref 96–108)
CO2 SERPL-SCNC: 27 MMOL/L — SIGNIFICANT CHANGE UP (ref 22–31)
CREAT SERPL-MCNC: 0.65 MG/DL — SIGNIFICANT CHANGE UP (ref 0.5–1.3)
EGFR: 106 ML/MIN/1.73M2 — SIGNIFICANT CHANGE UP
GLUCOSE SERPL-MCNC: 123 MG/DL — HIGH (ref 70–99)
HCG SERPL-ACNC: 3.3 MIU/ML — SIGNIFICANT CHANGE UP
HCT VFR BLD CALC: 42.6 % — SIGNIFICANT CHANGE UP (ref 34.5–45)
HGB BLD-MCNC: 14 G/DL — SIGNIFICANT CHANGE UP (ref 11.5–15.5)
MAGNESIUM SERPL-MCNC: 2.3 MG/DL — SIGNIFICANT CHANGE UP (ref 1.6–2.6)
MCHC RBC-ENTMCNC: 29.3 PG — SIGNIFICANT CHANGE UP (ref 27–34)
MCHC RBC-ENTMCNC: 32.9 GM/DL — SIGNIFICANT CHANGE UP (ref 32–36)
MCV RBC AUTO: 89.1 FL — SIGNIFICANT CHANGE UP (ref 80–100)
NRBC # BLD: 0 /100 WBCS — SIGNIFICANT CHANGE UP (ref 0–0)
PLATELET # BLD AUTO: 253 K/UL — SIGNIFICANT CHANGE UP (ref 150–400)
POTASSIUM SERPL-MCNC: 3.9 MMOL/L — SIGNIFICANT CHANGE UP (ref 3.5–5.3)
POTASSIUM SERPL-SCNC: 3.9 MMOL/L — SIGNIFICANT CHANGE UP (ref 3.5–5.3)
RBC # BLD: 4.78 M/UL — SIGNIFICANT CHANGE UP (ref 3.8–5.2)
RBC # FLD: 12.6 % — SIGNIFICANT CHANGE UP (ref 10.3–14.5)
SODIUM SERPL-SCNC: 143 MMOL/L — SIGNIFICANT CHANGE UP (ref 135–145)
WBC # BLD: 5.05 K/UL — SIGNIFICANT CHANGE UP (ref 3.8–10.5)
WBC # FLD AUTO: 5.05 K/UL — SIGNIFICANT CHANGE UP (ref 3.8–10.5)

## 2024-05-30 PROCEDURE — 80048 BASIC METABOLIC PNL TOTAL CA: CPT

## 2024-05-30 PROCEDURE — C1874: CPT

## 2024-05-30 PROCEDURE — 93454 CORONARY ARTERY ANGIO S&I: CPT | Mod: 59

## 2024-05-30 PROCEDURE — 92928 PRQ TCAT PLMT NTRAC ST 1 LES: CPT | Mod: LD

## 2024-05-30 PROCEDURE — 93010 ELECTROCARDIOGRAM REPORT: CPT

## 2024-05-30 PROCEDURE — 84702 CHORIONIC GONADOTROPIN TEST: CPT

## 2024-05-30 PROCEDURE — 85027 COMPLETE CBC AUTOMATED: CPT

## 2024-05-30 PROCEDURE — 93010 ELECTROCARDIOGRAM REPORT: CPT | Mod: 77

## 2024-05-30 PROCEDURE — C1769: CPT

## 2024-05-30 PROCEDURE — 93454 CORONARY ARTERY ANGIO S&I: CPT | Mod: 26,59

## 2024-05-30 PROCEDURE — C1894: CPT

## 2024-05-30 PROCEDURE — 83735 ASSAY OF MAGNESIUM: CPT

## 2024-05-30 PROCEDURE — C1887: CPT

## 2024-05-30 PROCEDURE — 99152 MOD SED SAME PHYS/QHP 5/>YRS: CPT

## 2024-05-30 PROCEDURE — 82962 GLUCOSE BLOOD TEST: CPT

## 2024-05-30 PROCEDURE — 93005 ELECTROCARDIOGRAM TRACING: CPT

## 2024-05-30 PROCEDURE — C9600: CPT | Mod: LD

## 2024-05-30 RX ORDER — METFORMIN HYDROCHLORIDE 850 MG/1
1 TABLET ORAL
Refills: 0 | DISCHARGE

## 2024-05-30 RX ORDER — SODIUM CHLORIDE 9 MG/ML
250 INJECTION INTRAMUSCULAR; INTRAVENOUS; SUBCUTANEOUS ONCE
Refills: 0 | Status: COMPLETED | OUTPATIENT
Start: 2024-05-30 | End: 2024-05-30

## 2024-05-30 RX ORDER — INSULIN GLARGINE 100 [IU]/ML
30 INJECTION, SOLUTION SUBCUTANEOUS
Refills: 0 | DISCHARGE

## 2024-05-30 RX ORDER — SODIUM CHLORIDE 9 MG/ML
1000 INJECTION INTRAMUSCULAR; INTRAVENOUS; SUBCUTANEOUS
Refills: 0 | Status: DISCONTINUED | OUTPATIENT
Start: 2024-05-30 | End: 2024-06-13

## 2024-05-30 RX ORDER — INSULIN GLARGINE 100 [IU]/ML
26 INJECTION, SOLUTION SUBCUTANEOUS
Refills: 0 | DISCHARGE

## 2024-05-30 RX ORDER — METOPROLOL TARTRATE 50 MG
1 TABLET ORAL
Refills: 0 | DISCHARGE

## 2024-05-30 RX ORDER — ACETAMINOPHEN 500 MG
650 TABLET ORAL EVERY 6 HOURS
Refills: 0 | Status: DISCONTINUED | OUTPATIENT
Start: 2024-05-30 | End: 2024-06-13

## 2024-05-30 RX ADMIN — SODIUM CHLORIDE 75 MILLILITER(S): 9 INJECTION INTRAMUSCULAR; INTRAVENOUS; SUBCUTANEOUS at 13:09

## 2024-05-30 RX ADMIN — SODIUM CHLORIDE 75 MILLILITER(S): 9 INJECTION INTRAMUSCULAR; INTRAVENOUS; SUBCUTANEOUS at 09:07

## 2024-05-30 RX ADMIN — Medication 650 MILLIGRAM(S): at 10:59

## 2024-05-30 RX ADMIN — Medication 650 MILLIGRAM(S): at 11:30

## 2024-05-30 RX ADMIN — SODIUM CHLORIDE 750 MILLILITER(S): 9 INJECTION INTRAMUSCULAR; INTRAVENOUS; SUBCUTANEOUS at 08:08

## 2024-05-30 NOTE — CHART NOTE - NSCHARTNOTEFT_GEN_A_CORE
Cardiac Rehab (STEMI/NSTEMI/ACS/Unstable Angina/CHF/Chronic Stable Angina/Heart Surgery (CABG,Valve)/Post PCI):              *Education on benefits of Cardiac Rehab provided to patient: Yes         *Referral and Prescription Given for Cardiac Rehab : Yes         *Pt given list of locations & instructed to contact their insurance company to review list of participating providers: Yes         *Pt instructed to bring Cardiac Rehab prescription with them to Cardiology Follow up appointment for assistance with enrollment: Yes         *Pt discharged with copies detail cardiovascular history, medications, testing/treatments: Yes
Removal of Femoral Sheath    Pulses in the (right/left) lower extremity are doppler. The patient was placed in the supine position. The insertion site was identified and the sutures were removed per protocol.  The 6 Persian femoral sheath was then removed. Direct pressure was applied for  18 minutes.     Monitoring of the right  groin and both lower extremities including neuro-vascular checks and vital signs every 15 minutes x 4, then every 30 minutes x 2, then every 1 hour was ordered.    Complications: None

## 2024-05-30 NOTE — ASU PATIENT PROFILE, ADULT - FALL HARM RISK - UNIVERSAL INTERVENTIONS
Bed in lowest position, wheels locked, appropriate side rails in place/Call bell, personal items and telephone in reach/Instruct patient to call for assistance before getting out of bed or chair/Non-slip footwear when patient is out of bed/[place] to call system/Physically safe environment - no spills, clutter or unnecessary equipment/Purposeful Proactive Rounding/Room/bathroom lighting operational, light cord in reach

## 2024-05-30 NOTE — H&P CARDIOLOGY - HISTORY OF PRESENT ILLNESS
54 y/o female with h/o recent STEMI, RCA PCI and staging LCX 04/2024, DM, HTN, HLD who presents today for [facility]. Patient states she continues to have exertional chest pain after PCI in April. She reports that she is compliant with her meds and on ASA and Plavix. Denies any implanted cardiac monitoring devices.

## 2024-05-30 NOTE — H&P CARDIOLOGY - NSICDXPASTMEDICALHX_GEN_ALL_CORE_FT
PAST MEDICAL HISTORY:  CAD (coronary artery disease)     DM (diabetes mellitus)     HLD (hyperlipidemia)     STEMI (ST elevation myocardial infarction)

## 2024-05-30 NOTE — ASU DISCHARGE PLAN (ADULT/PEDIATRIC) - ASU DC SPECIAL INSTRUCTIONSFT
Please see the PRE PRINTED DISCHARGE SHEET given to you by your nurse    We have provided you with a prescription for cardiac rehab which is medically supervised exercise program for your heart and has been shown to improve the quantity and quality of life of people with heart disease like yours. You should attend cardiac rehab 3 times per week for 12 weeks. We have provided you with a list of nearby facilities. Please call your insurance carrier to determine which of these facilities are covered under your plan. Please bring this prescription with you to your follow up appointment with your cardiologist who can then further assist you to enroll into a cardiac rehab program.

## 2024-05-30 NOTE — ASU DISCHARGE PLAN (ADULT/PEDIATRIC) - CARE PROVIDER_API CALL
Nena Cohn  Interventional Cardiology  [address] [place] [address], [address] [place] [id number]  Phone: [phone]  Fax: [phone]  Follow Up Time: 2 weeks

## 2024-06-18 ENCOUNTER — NON-APPOINTMENT (OUTPATIENT)
Age: 54
End: 2024-06-18

## 2024-06-18 ENCOUNTER — APPOINTMENT (OUTPATIENT)
Dept: CARDIOLOGY | Facility: CLINIC | Age: 54
End: 2024-06-18
Payer: COMMERCIAL

## 2024-06-18 VITALS
SYSTOLIC BLOOD PRESSURE: 105 MMHG | BODY MASS INDEX: 22.32 KG/M2 | WEIGHT: 126 LBS | DIASTOLIC BLOOD PRESSURE: 66 MMHG | OXYGEN SATURATION: 99 % | HEART RATE: 76 BPM

## 2024-06-18 DIAGNOSIS — I25.10 ATHEROSCLEROTIC HEART DISEASE OF NATIVE CORONARY ARTERY W/OUT ANGINA PECTORIS: ICD-10-CM

## 2024-06-18 DIAGNOSIS — E78.5 HYPERLIPIDEMIA, UNSPECIFIED: ICD-10-CM

## 2024-06-18 PROBLEM — E11.9 TYPE 2 DIABETES MELLITUS WITHOUT COMPLICATIONS: Chronic | Status: ACTIVE | Noted: 2024-05-30

## 2024-06-18 PROBLEM — I21.3 ST ELEVATION (STEMI) MYOCARDIAL INFARCTION OF UNSPECIFIED SITE: Chronic | Status: ACTIVE | Noted: 2024-05-30

## 2024-06-18 PROCEDURE — 99214 OFFICE O/P EST MOD 30 MIN: CPT | Mod: 25

## 2024-06-18 PROCEDURE — 93000 ELECTROCARDIOGRAM COMPLETE: CPT

## 2024-06-18 RX ORDER — LOSARTAN POTASSIUM 25 MG/1
25 TABLET, FILM COATED ORAL DAILY
Qty: 90 | Refills: 1 | Status: DISCONTINUED | COMMUNITY
Start: 2024-04-23 | End: 2024-06-18

## 2024-07-01 PROBLEM — E78.5 HYPERLIPIDEMIA, UNSPECIFIED HYPERLIPIDEMIA TYPE: Status: ACTIVE | Noted: 2024-05-06

## 2024-07-01 PROBLEM — I25.10 CORONARY ARTERY ARTERIOSCLEROSIS: Status: ACTIVE | Noted: 2024-04-23

## 2024-08-12 ENCOUNTER — NON-APPOINTMENT (OUTPATIENT)
Age: 54
End: 2024-08-12

## 2024-08-13 ENCOUNTER — NON-APPOINTMENT (OUTPATIENT)
Age: 54
End: 2024-08-13

## 2024-08-13 ENCOUNTER — APPOINTMENT (OUTPATIENT)
Dept: CARDIOLOGY | Facility: CLINIC | Age: 54
End: 2024-08-13
Payer: COMMERCIAL

## 2024-08-13 VITALS
BODY MASS INDEX: 22.15 KG/M2 | OXYGEN SATURATION: 98 % | WEIGHT: 125 LBS | HEIGHT: 63 IN | DIASTOLIC BLOOD PRESSURE: 73 MMHG | SYSTOLIC BLOOD PRESSURE: 106 MMHG | HEART RATE: 70 BPM

## 2024-08-13 DIAGNOSIS — R06.02 SHORTNESS OF BREATH: ICD-10-CM

## 2024-08-13 DIAGNOSIS — I25.10 ATHEROSCLEROTIC HEART DISEASE OF NATIVE CORONARY ARTERY W/OUT ANGINA PECTORIS: ICD-10-CM

## 2024-08-13 DIAGNOSIS — E78.5 HYPERLIPIDEMIA, UNSPECIFIED: ICD-10-CM

## 2024-08-13 PROCEDURE — 93000 ELECTROCARDIOGRAM COMPLETE: CPT

## 2024-08-13 PROCEDURE — G2211 COMPLEX E/M VISIT ADD ON: CPT

## 2024-08-13 PROCEDURE — 99214 OFFICE O/P EST MOD 30 MIN: CPT

## 2024-08-15 NOTE — DISCUSSION/SUMMARY
[FreeTextEntry1] : 54 yo woman with recent STEMI RCA PCI and staging LCX 04/2024, DM, HTN, HLD who presents for a follow up.   STEMI s/p PCI to the RCA and staged LCx 04/01/24 and 04/02/24 respectively, then developed more symptoms and underwent another PCI of the mid LAD most recently 05/30/24   -cont DAPT  -cardiac rehab referral provided previously -cont BB I previously switched her to Toprol XL from Coreg  -stopped Losartan given low BP at the last visit  -preserved EF per TTE post MI, will re-check at this time given more with SOB   HLD  -cont Lipitor 80 mg daily -will need eventual check   Follow up with me as scheduled, but if symptoms get worse, MITESH RUBY advised to go to the ED.  I have spent 30 min on this encounter by seeing the pt, reviewing previous records, discussing plan of care and documenting this encounter.

## 2024-08-15 NOTE — HISTORY OF PRESENT ILLNESS
[FreeTextEntry1] : 52 yo woman with recent STEMI RCA PCI and staging LCX 2024, DM, HTN, HLD who presents for a follow up.  Pt is also post recent PCI 2024.  Here with her family relative.  At the last visit she was feeling much better since PCI, no CP or RUBY.  Has been off Losartan since the last visit.    Today she comes in for an urgent visit.  Pt further explains that her mother  on , has been having SOB intermittent initially then lately now all the time since she heard from her friend that her father .  Pt reports more SOB, no CP, rubbing chest not helping, SOB unrelated to activity.  Has been skipping meds occasionally since mother's death, diet has been poor, not walking around much.  She does recall that even the day before her mother's death she felt great, went to work, no symptoms.  Occasional pain in L arm but started after last cath, unrelated to activity.    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

## 2024-08-15 NOTE — DISCUSSION/SUMMARY
[FreeTextEntry1] : 52 yo woman with recent STEMI RCA PCI and staging LCX 04/2024, DM, HTN, HLD who presents for a follow up.   STEMI s/p PCI to the RCA and staged LCx 04/01/24 and 04/02/24 respectively, then developed more symptoms and underwent another PCI of the mid LAD most recently 05/30/24   -cont DAPT  -cardiac rehab referral provided previously -cont BB I previously switched her to Toprol XL from Coreg  -stopped Losartan given low BP at the last visit  -preserved EF per TTE post MI, will re-check at this time given more with SOB   HLD  -cont Lipitor 80 mg daily -will need eventual check   Follow up with me as scheduled, but if symptoms get worse, MITESH RUBY advised to go to the ED.  I have spent 30 min on this encounter by seeing the pt, reviewing previous records, discussing plan of care and documenting this encounter.

## 2024-08-15 NOTE — REVIEW OF SYSTEMS
[Weight Loss (___ Lbs)] : [unfilled] ~Ulb weight loss [Numbness (Hypoesthesia)] : numbness [Negative] : Psychiatric [SOB] : no shortness of breath [FreeTextEntry5] : chest pain position  [Fever] : no fever [Headache] : no headache [Weight Gain (___ Lbs)] : no recent weight gain [Chills] : no chills [Feeling Fatigued] : not feeling fatigued [Blurry Vision] : no blurred vision [Seeing Double (Diplopia)] : no diplopia [Earache] : no earache [Hearing Loss] : no hearing loss [Sore Throat] : no sore throat [Tinnitus] : no tinnitus [Dyspnea on exertion] : not dyspnea during exertion [Chest Discomfort] : no chest discomfort [Lower Ext Edema] : no extremity edema [Palpitations] : no palpitations [Orthopnea] : no orthopnea [PND] : no PND [Syncope] : no syncope [Cough] : no cough [Snoring] : no snoring [Abdominal Pain] : no abdominal pain [Nausea] : no nausea [Vomiting] : no vomiting [Diarrhea] : diarrhea [Dysuria] : no dysuria [Abn Vaginal Bleeding] : no unexplained vaginal bleeding [Rash] : no rash [Dizziness] : no dizziness [Weakness] : no weakness [Speech Disturbance] : no speech disturbance [Easy Bleeding] : no tendency for easy bleeding [Easy Bruising] : no tendency for easy bruising

## 2024-08-15 NOTE — HISTORY OF PRESENT ILLNESS
[FreeTextEntry1] : 52 yo woman with recent STEMI RCA PCI and staging LCX 2024, DM, HTN, HLD who presents for a follow up.  Pt is also post recent PCI 2024.  Here with her family relative.  At the last visit she was feeling much better since PCI, no CP or RUBY.  Has been off Losartan since the last visit.    Today she comes in for an urgent visit.  Pt further explains that her mother  on , has been having SOB intermittent initially then lately now all the time since she heard from her friend that her father .  Pt reports more SOB, no CP, rubbing chest not helping, SOB unrelated to activity.  Has been skipping meds occasionally since mother's death, diet has been poor, not walking around much.  She does recall that even the day before her mother's death she felt great, went to work, no symptoms.  Occasional pain in L arm but started after last cath, unrelated to activity.    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

## 2024-08-21 ENCOUNTER — APPOINTMENT (OUTPATIENT)
Dept: CARDIOLOGY | Facility: CLINIC | Age: 54
End: 2024-08-21
Payer: COMMERCIAL

## 2024-08-21 PROCEDURE — 93306 TTE W/DOPPLER COMPLETE: CPT

## 2024-08-23 ENCOUNTER — NON-APPOINTMENT (OUTPATIENT)
Age: 54
End: 2024-08-23

## 2024-09-24 ENCOUNTER — APPOINTMENT (OUTPATIENT)
Dept: CARDIOLOGY | Facility: CLINIC | Age: 54
End: 2024-09-24

## 2024-10-16 ENCOUNTER — APPOINTMENT (OUTPATIENT)
Dept: CARDIOLOGY | Facility: CLINIC | Age: 54
End: 2024-10-16

## 2024-10-23 NOTE — ED ADULT NURSE NOTE - BREATHING
Patient Education     Buprenorphine and Naloxone (byoo pre NOR feen & nal OKS one)   Brand Names: US Bunavail [DSC]; Suboxone; Zubsolv   Brand Names: Dianna PMS-Buprenorphine-Naloxone; Suboxone; TEVA-Buprenorphine/Naloxone   What is this drug used for?   It is used to treat opioid use disorder. Opioid drugs include heroin and prescription pain drugs like oxycodone and morphine.  Do not use for pain relief or on an as needed basis.  What do I need to tell my doctor BEFORE I take this drug?   If you are allergic to this drug; any part of this drug; or any other drugs, foods, or substances. Tell your doctor about the allergy and what signs you had.  If you have liver disease.  If you have not been taking an opioid drug.  If you have taken certain drugs for depression or Parkinson's disease in the last 14 days. This includes isocarboxazid, phenelzine, tranylcypromine, selegiline, or rasagiline. Very high blood pressure may happen.  If you are taking any of these drugs: Linezolid or methylene blue.  This is not a list of all drugs or health problems that interact with this drug.  Tell your doctor and pharmacist about all of your drugs (prescription or OTC, natural products, vitamins) and health problems. You must check to make sure that it is safe for you to take this drug with all of your drugs and health problems. Do not start, stop, or change the dose of any drug without checking with your doctor.  What are some things I need to know or do while I take this drug?   Tell all of your health care providers that you take this drug. This includes your doctors, nurses, pharmacists, and dentists.  Avoid driving and doing other tasks or actions that call for you to be alert until you see how this drug affects you.  To lower the chance of feeling dizzy or passing out, rise slowly if you have been sitting or lying down. Be careful going up and down stairs.  Your doctor may order naloxone to keep with you to help treat an opioid  overdose if needed. Opioid overdose may happen if you start taking opioid drugs again or if too much of this drug is taken. If you have questions about how to get or use naloxone, talk with your doctor or pharmacist. If you think there has been an opioid overdose, get medical care right away even if naloxone has been used.  Long-term use of an opioid drug may lead to lower sex hormone levels. Call your doctor if you have a lowered interest in sex, fertility problems, no menstrual period, or ejaculation problems.  Even one dose of this drug may be deadly if it is taken by someone else or by accident, especially in children. If this drug is taken by someone else or by accident, get medical help right away.  Signs of opioid withdrawal have happened with this drug. Call your doctor right away if you have sweating, chills, diarrhea or stomach pain that is not normal, anxiety, feeling irritable, or yawning.  Liver problems have rarely happened with this drug. Sometimes, this has been deadly. Call your doctor right away if you have signs of liver problems like dark urine, tiredness, decreased appetite, upset stomach or stomach pain, light-colored stools, throwing up, or yellow skin or eyes.  This drug has an opioid in it. Severe side effects have happened when opioid drugs were used with benzodiazepines, alcohol, marijuana, other forms of cannabis, or street drugs. This includes severe drowsiness, breathing problems, and death. Benzodiazepines include drugs like alprazolam, diazepam, and lorazepam. If you have questions, talk with the doctor.  Do not take with alcohol or products that have alcohol. Unsafe and sometimes deadly effects may happen.  Dental problems like cavities, infections, and loss of teeth have happened with buprenorphine products that are dissolved in the mouth. This has even happened in people who did not already have dental problems. Call your doctor and your dentist right away if you have any problems  with your teeth or gums.  If you are pregnant or plan to get pregnant, talk with your doctor right away. Using this drug for a long time during pregnancy may lead to withdrawal in the  baby. Withdrawal in the  can be life-threatening if not treated.  Tell your doctor if you are breast-feeding or plan to breast-feed. This drug passes into breast milk and may harm your baby.  What are some side effects that I need to call my doctor about right away?   WARNING/CAUTION: Even though it may be rare, some people may have very bad and sometimes deadly side effects when taking a drug. Tell your doctor or get medical help right away if you have any of the following signs or symptoms that may be related to a very bad side effect:  Signs of an allergic reaction, like rash; hives; itching; red, swollen, blistered, or peeling skin with or without fever; wheezing; tightness in the chest or throat; trouble breathing, swallowing, or talking; unusual hoarseness; or swelling of the mouth, face, lips, tongue, or throat.  Signs of low blood sugar like dizziness, headache, feeling sleepy, feeling weak, shaking, a fast heartbeat, confusion, hunger, or sweating.  Change in eyesight.  Feeling nervous and excitable.  Change in balance.  Depression or other mood changes.  Feeling confused, not able to focus, or change in behavior.  Extra muscle action or slow movement.  Slurred speech.  Feeling sluggish, drunk, or out of sorts.  A heartbeat that does not feel normal.  Noisy breathing.  Breathing problems during sleep (sleep apnea).  This drug may cause very bad and sometimes deadly breathing problems. Call your doctor right away if you have slow, shallow, or trouble breathing.  Get medical help right away if you feel very sleepy, very dizzy, or if you pass out. Caregivers or others need to get medical help right away if the patient does not respond, does not answer or react like normal, or will not wake up.  Taking an opioid  drug like this drug may lead to a rare but severe adrenal gland problem. Call your doctor right away if you feel very tired or weak, you pass out, or you have severe dizziness, very upset stomach, throwing up, or decreased appetite.  A severe and sometimes deadly problem called serotonin syndrome may happen if you take this drug with certain other drugs. Call your doctor right away if you have agitation; change in balance; confusion; hallucinations; fever; fast or abnormal heartbeat; flushing; muscle twitching or stiffness; seizures; shivering or shaking; sweating a lot; severe diarrhea, upset stomach, or throwing up; or severe headache.  What are some other side effects of this drug?   All drugs may cause side effects. However, many people have no side effects or only have minor side effects. Call your doctor or get medical help if any of these side effects or any other side effects bother you or do not go away:  All products:   Feeling dizzy, sleepy, tired, or weak.  Constipation, diarrhea, stomach pain, upset stomach, or throwing up.  Headache.  Trouble sleeping.  Sweating a lot.  Flushing.  Back pain.  Under the tongue (sublingual) film:   Burning.  Numbness or tingling in the mouth.  Pain where it was placed.  Redness.  These are not all of the side effects that may occur. If you have questions about side effects, call your doctor. Call your doctor for medical advice about side effects.  You may report side effects to your national health agency.  You may report side effects to the FDA at 1-584.545.8465. You may also report side effects at https://www.fda.gov/medwatch.  How is this drug best taken?   Use this drug as ordered by your doctor. Read all information given to you. Follow all instructions closely.  All products:   Take this drug at the same time of day.  Do not chew, cut, or swallow this drug.  Do not eat, drink, smoke, or talk while this drug is dissolving.  Take by mouth only. Very bad and sometimes  deadly side effects may happen if this drug is injected.  After this drug has dissolved, take a large sip of water, swish it around in your mouth, and swallow. Wait at least 1 hour before brushing your teeth.  Take good care of your teeth. See a dentist often.  Have blood work checked as you have been told by the doctor. Talk with the doctor.  If you are 65 or older, use this drug with care. You could have more side effects.  This drug may be habit-forming with long-term use.  This drug has a risk of misuse. Use this drug only as you were told by your doctor. Tell your doctor if you have ever had alcohol or drug use disorder.  You will be watched closely to make sure you do not misuse this drug or develop drug use disorder.  Do not stop taking this drug all of a sudden without calling your doctor. You may have a greater risk of signs of withdrawal. If you need to stop this drug, you will want to slowly stop it as ordered by your doctor.  Many drugs interact with this drug and can raise the chance of side effects like deadly breathing problems. Talk with your doctor and pharmacist to make sure it is safe to use this drug with all of your drugs.  All film products:   Open right before use.  Be sure your hands are dry before you touch this drug.  This drug must be taken whole. Do not cut or tear this drug. Do not touch the film with your tongue or finger once it has been placed.  Under the tongue (sublingual) film:   Some products need to be placed under the tongue. Some products may be placed under the tongue or on the inside of the cheek. Be sure you know how this drug needs to be taken. If you are not sure, check with the pharmacist.  If using under the tongue, wet mouth with water. Place film under the tongue close to the base on the left or right side and let it dissolve.  If using on the inside of the cheek, wet the inside of your cheek with your tongue or water. Place the film inside the mouth on a wet cheek and  let it dissolve.  If using 2 films, place on opposite sides. Try not to let films touch.  If using 3 films, place the third film under the tongue after the first 2 films have dissolved.  Cheek film:   Wet the inside of your cheek with your tongue or water.  Place the film inside the mouth on a wet cheek. Hold for 5 seconds so it sticks to the cheek. Let it dissolve.  Place the side of the film with the writing against the inside of the cheek.  If using 2 films, place on opposite sides. If using many films, do not place more than 2 films on the inside of 1 cheek at a time.  Under the tongue (sublingual) tablet:   Place tablet under the tongue and let dissolve.  If your doctor tells you to use more than 1 tablet at a time, ask your doctor how to take them.  What do I do if I miss a dose?   Take a missed dose as soon as you think about it.  If it is close to the time for your next dose, skip the missed dose and go back to your normal time.  Do not take 2 doses at the same time or extra doses.  If you are not sure what to do if you miss a dose, call your doctor.  How do I store and/or throw out this drug?   All products:   Store at room temperature in a dry place. Do not store in a bathroom.  Store this drug in a safe place where children cannot see or reach it, and where other people cannot get to it. A locked box or area may help keep this drug safe. Keep all drugs away from pets.  All film products:   Do not freeze.  Store in foil pouch until ready for use.  General drug facts   Throw away unused or  drugs. Do not flush down a toilet or pour down a drain unless you are told to do so. Check with your pharmacist if you have questions about the best way to throw out drugs. There may be drug take-back programs in your area.  If your symptoms or health problems do not get better or if they become worse, call your doctor.  Do not share your drugs with others and do not take anyone else's drugs.  Some drugs may have  another patient information leaflet. If you have any questions about this drug, please talk with your doctor, nurse, pharmacist, or other health care provider.  This drug comes with an extra patient fact sheet called a Medication Guide. Read it with care. Read it again each time this drug is refilled. If you have any questions about this drug, please talk with the doctor, pharmacist, or other health care provider.  If you think there has been an overdose, call your poison control center or get medical care right away. Be ready to tell or show what was taken, how much, and when it happened.  Consumer Information Use and Disclaimer   This generalized information is a limited summary of diagnosis, treatment, and/or medication information. It is not meant to be comprehensive and should be used as a tool to help the user understand and/or assess potential diagnostic and treatment options. It does NOT include all information about conditions, treatments, medications, side effects, or risks that may apply to a specific patient. It is not intended to be medical advice or a substitute for the medical advice, diagnosis, or treatment of a health care provider based on the health care provider's examination and assessment of a patient's specific and unique circumstances. Patients must speak with a health care provider for complete information about their health, medical questions, and treatment options, including any risks or benefits regarding use of medications. This information does not endorse any treatments or medications as safe, effective, or approved for treating a specific patient. [employer] and its affiliates disclaim any warranty or liability relating to this information or the use thereof. The use of this information is governed by the Terms of Use, available at https://www.wolterskluwer.com/en/know/clinical-effectiveness-terms.  Last Reviewed Date   2024-01-29  Copyright   © 2024 [employer] and its  affiliates and/or licensors. All rights reserved.     spontaneous

## 2024-10-29 ENCOUNTER — NON-APPOINTMENT (OUTPATIENT)
Age: 54
End: 2024-10-29

## 2024-11-05 ENCOUNTER — RX RENEWAL (OUTPATIENT)
Age: 54
End: 2024-11-05

## 2024-11-05 ENCOUNTER — NON-APPOINTMENT (OUTPATIENT)
Age: 54
End: 2024-11-05

## 2024-11-05 ENCOUNTER — APPOINTMENT (OUTPATIENT)
Dept: CARDIOLOGY | Facility: CLINIC | Age: 54
End: 2024-11-05

## 2024-11-05 VITALS
BODY MASS INDEX: 22.14 KG/M2 | OXYGEN SATURATION: 98 % | SYSTOLIC BLOOD PRESSURE: 105 MMHG | HEART RATE: 69 BPM | DIASTOLIC BLOOD PRESSURE: 71 MMHG | WEIGHT: 125 LBS

## 2024-11-05 PROCEDURE — 99214 OFFICE O/P EST MOD 30 MIN: CPT | Mod: 25

## 2024-11-05 PROCEDURE — 93000 ELECTROCARDIOGRAM COMPLETE: CPT

## 2024-12-10 ENCOUNTER — APPOINTMENT (OUTPATIENT)
Dept: CARDIOLOGY | Facility: CLINIC | Age: 54
End: 2024-12-10
Payer: COMMERCIAL

## 2024-12-10 PROCEDURE — 78452 HT MUSCLE IMAGE SPECT MULT: CPT

## 2024-12-10 PROCEDURE — 93015 CV STRESS TEST SUPVJ I&R: CPT

## 2024-12-10 PROCEDURE — A9500: CPT

## 2024-12-12 DIAGNOSIS — F32.A DEPRESSION, UNSPECIFIED: ICD-10-CM

## 2024-12-12 RX ORDER — MIRTAZAPINE 7.5 MG/1
7.5 TABLET, FILM COATED ORAL
Qty: 30 | Refills: 0 | Status: ACTIVE | COMMUNITY
Start: 2024-12-12 | End: 1900-01-01

## 2025-03-07 ENCOUNTER — RX RENEWAL (OUTPATIENT)
Age: 55
End: 2025-03-07

## 2025-04-08 ENCOUNTER — APPOINTMENT (OUTPATIENT)
Dept: CARDIOLOGY | Facility: CLINIC | Age: 55
End: 2025-04-08

## 2025-04-08 ENCOUNTER — NON-APPOINTMENT (OUTPATIENT)
Age: 55
End: 2025-04-08

## 2025-04-08 VITALS
BODY MASS INDEX: 22.5 KG/M2 | HEART RATE: 63 BPM | SYSTOLIC BLOOD PRESSURE: 122 MMHG | OXYGEN SATURATION: 100 % | DIASTOLIC BLOOD PRESSURE: 78 MMHG | WEIGHT: 127 LBS

## 2025-04-08 PROCEDURE — G2211 COMPLEX E/M VISIT ADD ON: CPT | Mod: NC

## 2025-04-08 PROCEDURE — 93000 ELECTROCARDIOGRAM COMPLETE: CPT

## 2025-04-08 PROCEDURE — 99214 OFFICE O/P EST MOD 30 MIN: CPT

## 2025-07-15 ENCOUNTER — APPOINTMENT (OUTPATIENT)
Dept: CARDIOLOGY | Facility: CLINIC | Age: 55
End: 2025-07-15
Payer: MEDICAID

## 2025-07-15 ENCOUNTER — APPOINTMENT (OUTPATIENT)
Dept: ELECTROPHYSIOLOGY | Facility: CLINIC | Age: 55
End: 2025-07-15

## 2025-07-15 VITALS
SYSTOLIC BLOOD PRESSURE: 105 MMHG | HEART RATE: 68 BPM | DIASTOLIC BLOOD PRESSURE: 69 MMHG | OXYGEN SATURATION: 98 % | WEIGHT: 126 LBS | BODY MASS INDEX: 22.32 KG/M2

## 2025-07-15 PROCEDURE — G2211 COMPLEX E/M VISIT ADD ON: CPT | Mod: NC

## 2025-07-15 PROCEDURE — 99214 OFFICE O/P EST MOD 30 MIN: CPT

## 2025-07-15 PROCEDURE — 93000 ELECTROCARDIOGRAM COMPLETE: CPT

## 2025-08-13 PROCEDURE — 93248 EXT ECG>7D<15D REV&INTERPJ: CPT
